# Patient Record
Sex: FEMALE | Race: WHITE | Employment: PART TIME | URBAN - METROPOLITAN AREA
[De-identification: names, ages, dates, MRNs, and addresses within clinical notes are randomized per-mention and may not be internally consistent; named-entity substitution may affect disease eponyms.]

---

## 2020-06-09 ENCOUNTER — APPOINTMENT (OUTPATIENT)
Dept: OTHER | Facility: HOSPITAL | Age: 60
End: 2020-06-09
Attending: FAMILY MEDICINE

## 2021-08-04 ENCOUNTER — HOSPITAL ENCOUNTER (OUTPATIENT)
Age: 61
Discharge: HOME | End: 2021-08-04
Attending: EMERGENCY MEDICINE
Payer: MEDICARE

## 2021-08-04 VITALS
HEART RATE: 80 BPM | DIASTOLIC BLOOD PRESSURE: 69 MMHG | OXYGEN SATURATION: 95 % | TEMPERATURE: 97 F | RESPIRATION RATE: 18 BRPM | SYSTOLIC BLOOD PRESSURE: 106 MMHG

## 2021-08-04 DIAGNOSIS — J98.01 BRONCHOSPASM: ICD-10-CM

## 2021-08-04 DIAGNOSIS — J01.41 ACUTE RECURRENT PANSINUSITIS: Primary | ICD-10-CM

## 2021-08-04 LAB — SARS-COV-2 RNA RESP QL NAA+PROBE: NOT DETECTED

## 2021-08-04 PROCEDURE — 99203 OFFICE O/P NEW LOW 30 MIN: CPT

## 2021-08-04 RX ORDER — BUDESONIDE AND FORMOTEROL FUMARATE DIHYDRATE 160; 4.5 UG/1; UG/1
2 AEROSOL RESPIRATORY (INHALATION) 2 TIMES DAILY
COMMUNITY
Start: 2021-07-05

## 2021-08-04 RX ORDER — LEVOFLOXACIN 500 MG/1
500 TABLET, FILM COATED ORAL DAILY
Qty: 10 TABLET | Refills: 0 | Status: SHIPPED | OUTPATIENT
Start: 2021-08-04 | End: 2021-08-14

## 2021-08-04 RX ORDER — ALBUTEROL SULFATE 90 UG/1
2 AEROSOL, METERED RESPIRATORY (INHALATION)
COMMUNITY
Start: 2021-07-07

## 2021-08-04 RX ORDER — QUETIAPINE 100 MG/1
100 TABLET, FILM COATED ORAL NIGHTLY
COMMUNITY
Start: 2021-05-13

## 2021-08-04 RX ORDER — ZOLPIDEM TARTRATE 10 MG/1
20 TABLET ORAL EVERY EVENING
COMMUNITY
Start: 2021-05-17

## 2021-08-04 RX ORDER — PREDNISONE 20 MG/1
40 TABLET ORAL DAILY
Qty: 10 TABLET | Refills: 0 | Status: SHIPPED | OUTPATIENT
Start: 2021-08-04 | End: 2021-08-04

## 2021-08-04 RX ORDER — FLUOXETINE HYDROCHLORIDE 20 MG/1
CAPSULE ORAL
COMMUNITY
Start: 2021-05-17

## 2021-08-04 RX ORDER — PREDNISONE 20 MG/1
40 TABLET ORAL DAILY
Qty: 10 TABLET | Refills: 0 | Status: SHIPPED | OUTPATIENT
Start: 2021-08-04 | End: 2021-08-09

## 2021-08-04 RX ORDER — METHOCARBAMOL 750 MG/1
TABLET ORAL
COMMUNITY
Start: 2021-05-05

## 2021-08-04 RX ORDER — ATORVASTATIN CALCIUM 40 MG/1
40 TABLET, FILM COATED ORAL DAILY
COMMUNITY
Start: 2021-05-11

## 2021-08-04 RX ORDER — LEVOFLOXACIN 500 MG/1
500 TABLET, FILM COATED ORAL DAILY
Qty: 10 TABLET | Refills: 0 | Status: SHIPPED | OUTPATIENT
Start: 2021-08-04 | End: 2021-08-04

## 2021-08-05 NOTE — ED PROVIDER NOTES
Patient Seen in: [facility] [place]      History   Patient presents with:  Cough/URI    Stated Complaint: sinus infection     HPI/Subjective:   HPI    The patient is a 62-year-old female with past history of recurrent sinusitis who presents now with rate and rhythm without murmur  Abdomen: Soft, nontender and nondistended  Neurologic: Patient is awake, alert and oriented ×3.   The patient's motor strength is 5 out of 5 and symmetric in the upper and lower extremities bilaterally  Extremities: No focal

## 2022-01-05 LAB — NONINV COLON CA DNA+OCC BLD SCRN STL QL: NORMAL

## 2022-06-06 ENCOUNTER — TELEPHONE (OUTPATIENT)
Dept: SCHEDULING | Age: 62
End: 2022-06-06

## 2022-06-07 ENCOUNTER — OFFICE VISIT (OUTPATIENT)
Dept: INTERNAL MEDICINE | Age: 62
End: 2022-06-07

## 2022-06-07 ENCOUNTER — TELEPHONE (OUTPATIENT)
Dept: SCHEDULING | Age: 62
End: 2022-06-07

## 2022-06-07 VITALS
BODY MASS INDEX: 43.11 KG/M2 | WEIGHT: 234.24 LBS | OXYGEN SATURATION: 97 % | SYSTOLIC BLOOD PRESSURE: 111 MMHG | HEIGHT: 62 IN | HEART RATE: 85 BPM | TEMPERATURE: 98.4 F | DIASTOLIC BLOOD PRESSURE: 72 MMHG | RESPIRATION RATE: 16 BRPM

## 2022-06-07 DIAGNOSIS — E78.5 HYPERLIPIDEMIA, UNSPECIFIED HYPERLIPIDEMIA TYPE: ICD-10-CM

## 2022-06-07 DIAGNOSIS — E66.01 CLASS 3 SEVERE OBESITY WITHOUT SERIOUS COMORBIDITY WITH BODY MASS INDEX (BMI) OF 60.0 TO 69.9 IN ADULT, UNSPECIFIED OBESITY TYPE (CMD): ICD-10-CM

## 2022-06-07 DIAGNOSIS — E03.9 ACQUIRED HYPOTHYROIDISM: ICD-10-CM

## 2022-06-07 DIAGNOSIS — F31.9 BIPOLAR 1 DISORDER (CMD): ICD-10-CM

## 2022-06-07 DIAGNOSIS — R51.9 NONINTRACTABLE HEADACHE, UNSPECIFIED CHRONICITY PATTERN, UNSPECIFIED HEADACHE TYPE: ICD-10-CM

## 2022-06-07 DIAGNOSIS — J44.9 CHRONIC OBSTRUCTIVE PULMONARY DISEASE, UNSPECIFIED COPD TYPE (CMD): ICD-10-CM

## 2022-06-07 DIAGNOSIS — G89.4 CHRONIC PAIN DISORDER: Primary | ICD-10-CM

## 2022-06-07 DIAGNOSIS — N18.9 CHRONIC KIDNEY DISEASE, UNSPECIFIED CKD STAGE: ICD-10-CM

## 2022-06-07 PROCEDURE — 99204 OFFICE O/P NEW MOD 45 MIN: CPT | Performed by: INTERNAL MEDICINE

## 2022-06-07 RX ORDER — FLUOXETINE HYDROCHLORIDE 20 MG/1
20 CAPSULE ORAL DAILY
COMMUNITY
End: 2022-06-07 | Stop reason: SDUPTHER

## 2022-06-07 RX ORDER — BUDESONIDE AND FORMOTEROL FUMARATE DIHYDRATE 80; 4.5 UG/1; UG/1
2 AEROSOL RESPIRATORY (INHALATION) 2 TIMES DAILY
Qty: 10.2 G | Refills: 12 | Status: SHIPPED | OUTPATIENT
Start: 2022-06-07

## 2022-06-07 RX ORDER — BENZTROPINE MESYLATE 1 MG/1
1 TABLET ORAL 2 TIMES DAILY
COMMUNITY
End: 2022-06-07 | Stop reason: SDUPTHER

## 2022-06-07 RX ORDER — ALBUTEROL SULFATE 90 UG/1
2 AEROSOL, METERED RESPIRATORY (INHALATION) EVERY 4 HOURS PRN
Qty: 2 EACH | Refills: 3 | Status: SHIPPED | OUTPATIENT
Start: 2022-06-07

## 2022-06-07 RX ORDER — BUTALBITAL, ASPIRIN, AND CAFFEINE 50; 325; 40 MG/1; MG/1; MG/1
1 CAPSULE ORAL EVERY 4 HOURS PRN
Qty: 60 CAPSULE | Refills: 1 | Status: SHIPPED | OUTPATIENT
Start: 2022-06-07 | End: 2022-07-18 | Stop reason: SDUPTHER

## 2022-06-07 RX ORDER — ATORVASTATIN CALCIUM 20 MG/1
20 TABLET, FILM COATED ORAL DAILY
Qty: 90 TABLET | Refills: 3 | Status: ON HOLD | OUTPATIENT
Start: 2022-06-07 | End: 2023-05-07

## 2022-06-07 RX ORDER — ATORVASTATIN CALCIUM 20 MG/1
20 TABLET, FILM COATED ORAL DAILY
Qty: 90 TABLET | Refills: 3 | Status: SHIPPED | OUTPATIENT
Start: 2022-06-07 | End: 2022-06-07 | Stop reason: DRUGHIGH

## 2022-06-07 RX ORDER — QUETIAPINE FUMARATE 25 MG/1
25 TABLET, FILM COATED ORAL 2 TIMES DAILY
Qty: 180 TABLET | Refills: 3 | Status: SHIPPED | OUTPATIENT
Start: 2022-06-07

## 2022-06-07 RX ORDER — FLUOXETINE HYDROCHLORIDE 20 MG/1
20 CAPSULE ORAL DAILY
Qty: 90 CAPSULE | Refills: 3 | Status: SHIPPED | OUTPATIENT
Start: 2022-06-07

## 2022-06-07 RX ORDER — BENZTROPINE MESYLATE 1 MG/1
1 TABLET ORAL 2 TIMES DAILY
Qty: 180 TABLET | Refills: 3 | Status: SHIPPED | OUTPATIENT
Start: 2022-06-07

## 2022-06-07 RX ORDER — ATORVASTATIN CALCIUM 40 MG/1
40 TABLET, FILM COATED ORAL DAILY
Qty: 90 TABLET | Refills: 3 | Status: SHIPPED | OUTPATIENT
Start: 2022-06-07 | End: 2023-09-25

## 2022-06-07 RX ORDER — MIRTAZAPINE 15 MG/1
15 TABLET, FILM COATED ORAL
COMMUNITY
End: 2022-06-07 | Stop reason: SDUPTHER

## 2022-06-07 RX ORDER — MIRTAZAPINE 15 MG/1
15 TABLET, FILM COATED ORAL NIGHTLY
Qty: 90 TABLET | Refills: 3 | Status: SHIPPED | OUTPATIENT
Start: 2022-06-07

## 2022-06-07 ASSESSMENT — PATIENT HEALTH QUESTIONNAIRE - PHQ9
SUM OF ALL RESPONSES TO PHQ9 QUESTIONS 1 AND 2: 0
CLINICAL INTERPRETATION OF PHQ2 SCORE: NO FURTHER SCREENING NEEDED
2. FEELING DOWN, DEPRESSED OR HOPELESS: NOT AT ALL
SUM OF ALL RESPONSES TO PHQ9 QUESTIONS 1 AND 2: 0
1. LITTLE INTEREST OR PLEASURE IN DOING THINGS: NOT AT ALL

## 2022-06-11 ENCOUNTER — TELEPHONE (OUTPATIENT)
Dept: SCHEDULING | Age: 62
End: 2022-06-11

## 2022-06-27 ENCOUNTER — TELEPHONE (OUTPATIENT)
Dept: SCHEDULING | Age: 62
End: 2022-06-27

## 2022-06-28 ENCOUNTER — HOSPITAL ENCOUNTER (OUTPATIENT)
Dept: GENERAL RADIOLOGY | Age: 62
Discharge: HOME | End: 2022-06-28

## 2022-06-28 ENCOUNTER — TELEPHONE (OUTPATIENT)
Dept: SCHEDULING | Age: 62
End: 2022-06-28

## 2022-06-28 ENCOUNTER — LAB SERVICES (OUTPATIENT)
Dept: LAB | Age: 62
End: 2022-06-28

## 2022-06-28 DIAGNOSIS — F31.9 BIPOLAR 1 DISORDER (CMD): ICD-10-CM

## 2022-06-28 DIAGNOSIS — E66.01 CLASS 3 SEVERE OBESITY WITHOUT SERIOUS COMORBIDITY WITH BODY MASS INDEX (BMI) OF 60.0 TO 69.9 IN ADULT, UNSPECIFIED OBESITY TYPE (CMD): ICD-10-CM

## 2022-06-28 DIAGNOSIS — G89.4 CHRONIC PAIN DISORDER: ICD-10-CM

## 2022-06-28 DIAGNOSIS — R51.9 NONINTRACTABLE HEADACHE, UNSPECIFIED CHRONICITY PATTERN, UNSPECIFIED HEADACHE TYPE: ICD-10-CM

## 2022-06-28 DIAGNOSIS — J44.9 CHRONIC OBSTRUCTIVE PULMONARY DISEASE, UNSPECIFIED COPD TYPE (CMD): ICD-10-CM

## 2022-06-28 DIAGNOSIS — E03.9 ACQUIRED HYPOTHYROIDISM: ICD-10-CM

## 2022-06-28 DIAGNOSIS — E78.5 HYPERLIPIDEMIA, UNSPECIFIED HYPERLIPIDEMIA TYPE: ICD-10-CM

## 2022-06-28 LAB
ALBUMIN SERPL-MCNC: 3.7 G/DL (ref 3.6–5.1)
ALBUMIN/GLOB SERPL: 1 {RATIO} (ref 1–2.4)
ALP SERPL-CCNC: 187 UNITS/L (ref 45–117)
ALT SERPL-CCNC: 23 UNITS/L
ANION GAP SERPL CALC-SCNC: 9 MMOL/L (ref 7–19)
APPEARANCE UR: CLEAR
AST SERPL-CCNC: 16 UNITS/L
BACTERIA #/AREA URNS HPF: ABNORMAL /HPF
BASOPHILS # BLD: 0.1 K/MCL (ref 0–0.3)
BASOPHILS NFR BLD: 1 %
BILIRUB SERPL-MCNC: 0.3 MG/DL (ref 0.2–1)
BILIRUB UR QL STRIP: NEGATIVE
BUN SERPL-MCNC: 17 MG/DL (ref 6–20)
BUN/CREAT SERPL: 13 (ref 7–25)
CALCIUM SERPL-MCNC: 9.6 MG/DL (ref 8.4–10.2)
CHLORIDE SERPL-SCNC: 107 MMOL/L (ref 97–110)
CHOLEST SERPL-MCNC: 241 MG/DL
CHOLEST/HDLC SERPL: 6 {RATIO}
CO2 SERPL-SCNC: 28 MMOL/L (ref 21–32)
COLOR UR: ABNORMAL
CREAT SERPL-MCNC: 1.27 MG/DL (ref 0.51–0.95)
DEPRECATED RDW RBC: 47.8 FL (ref 39–50)
EOSINOPHIL # BLD: 0.2 K/MCL (ref 0–0.5)
EOSINOPHIL NFR BLD: 2 %
ERYTHROCYTE [DISTWIDTH] IN BLOOD: 13.1 % (ref 11–15)
FASTING DURATION TIME PATIENT: ABNORMAL H
FASTING DURATION TIME PATIENT: ABNORMAL H
GFR SERPLBLD BASED ON 1.73 SQ M-ARVRAT: 48 ML/MIN
GLOBULIN SER-MCNC: 3.7 G/DL (ref 2–4)
GLUCOSE SERPL-MCNC: 90 MG/DL (ref 70–99)
GLUCOSE UR STRIP-MCNC: NEGATIVE MG/DL
HCT VFR BLD CALC: 50.5 % (ref 36–46.5)
HDLC SERPL-MCNC: 40 MG/DL
HGB BLD-MCNC: 16.2 G/DL (ref 12–15.5)
HGB UR QL STRIP: ABNORMAL
HYALINE CASTS #/AREA URNS LPF: ABNORMAL /LPF
IMM GRANULOCYTES # BLD AUTO: 0 K/MCL (ref 0–0.2)
IMM GRANULOCYTES # BLD: 0 %
KETONES UR STRIP-MCNC: NEGATIVE MG/DL
LDLC SERPL CALC-MCNC: 141 MG/DL
LEUKOCYTE ESTERASE UR QL STRIP: NEGATIVE
LYMPHOCYTES # BLD: 2.1 K/MCL (ref 1–4)
LYMPHOCYTES NFR BLD: 21 %
MCH RBC QN AUTO: 31.8 PG (ref 26–34)
MCHC RBC AUTO-ENTMCNC: 32.1 G/DL (ref 32–36.5)
MCV RBC AUTO: 99 FL (ref 78–100)
MONOCYTES # BLD: 0.5 K/MCL (ref 0.3–0.9)
MONOCYTES NFR BLD: 5 %
NEUTROPHILS # BLD: 7.2 K/MCL (ref 1.8–7.7)
NEUTROPHILS NFR BLD: 71 %
NITRITE UR QL STRIP: NEGATIVE
NONHDLC SERPL-MCNC: 201 MG/DL
NRBC BLD MANUAL-RTO: 0 /100 WBC
PH UR STRIP: 6 [PH] (ref 5–7)
PLATELET # BLD AUTO: 253 K/MCL (ref 140–450)
POTASSIUM SERPL-SCNC: 4.7 MMOL/L (ref 3.4–5.1)
PROT SERPL-MCNC: 7.4 G/DL (ref 6.4–8.2)
PROT UR STRIP-MCNC: NEGATIVE MG/DL
RBC # BLD: 5.1 MIL/MCL (ref 4–5.2)
RBC #/AREA URNS HPF: ABNORMAL /HPF
SODIUM SERPL-SCNC: 139 MMOL/L (ref 135–145)
SP GR UR STRIP: 1.01 (ref 1–1.03)
SQUAMOUS #/AREA URNS HPF: ABNORMAL /HPF
TRIGL SERPL-MCNC: 300 MG/DL
TSH SERPL-ACNC: 12.8 MCUNITS/ML (ref 0.35–5)
UROBILINOGEN UR STRIP-MCNC: 0.2 MG/DL
WBC # BLD: 10.1 K/MCL (ref 4.2–11)
WBC #/AREA URNS HPF: ABNORMAL /HPF

## 2022-06-28 PROCEDURE — 80053 COMPREHEN METABOLIC PANEL: CPT | Performed by: CLINICAL MEDICAL LABORATORY

## 2022-06-28 PROCEDURE — 72100 X-RAY EXAM L-S SPINE 2/3 VWS: CPT

## 2022-06-28 PROCEDURE — 85025 COMPLETE CBC W/AUTO DIFF WBC: CPT | Performed by: CLINICAL MEDICAL LABORATORY

## 2022-06-28 PROCEDURE — 80061 LIPID PANEL: CPT | Performed by: CLINICAL MEDICAL LABORATORY

## 2022-06-28 PROCEDURE — 73523 X-RAY EXAM HIPS BI 5/> VIEWS: CPT

## 2022-06-28 PROCEDURE — 36415 COLL VENOUS BLD VENIPUNCTURE: CPT | Performed by: CLINICAL MEDICAL LABORATORY

## 2022-06-28 PROCEDURE — 84443 ASSAY THYROID STIM HORMONE: CPT | Performed by: CLINICAL MEDICAL LABORATORY

## 2022-06-28 PROCEDURE — 81001 URINALYSIS AUTO W/SCOPE: CPT | Performed by: CLINICAL MEDICAL LABORATORY

## 2022-06-28 PROCEDURE — 71046 X-RAY EXAM CHEST 2 VIEWS: CPT

## 2022-06-28 RX ORDER — LEVOTHYROXINE SODIUM 200 UG/1
200 CAPSULE ORAL DAILY
Qty: 30 CAPSULE | Refills: 5 | Status: SHIPPED | OUTPATIENT
Start: 2022-06-28

## 2022-07-05 ENCOUNTER — TELEPHONE (OUTPATIENT)
Dept: INTERNAL MEDICINE | Age: 62
End: 2022-07-05

## 2022-07-07 ENCOUNTER — TELEPHONE (OUTPATIENT)
Dept: INTERNAL MEDICINE | Age: 62
End: 2022-07-07

## 2022-07-18 RX ORDER — BUTALBITAL, ASPIRIN, AND CAFFEINE 50; 325; 40 MG/1; MG/1; MG/1
1 CAPSULE ORAL EVERY 4 HOURS PRN
Qty: 60 CAPSULE | Refills: 1 | Status: SHIPPED | OUTPATIENT
Start: 2022-07-18 | End: 2022-09-06

## 2022-08-05 ENCOUNTER — E-ADVICE (OUTPATIENT)
Dept: INTERNAL MEDICINE | Age: 62
End: 2022-08-05

## 2022-08-08 ENCOUNTER — OFFICE VISIT (OUTPATIENT)
Dept: INTERNAL MEDICINE | Age: 62
End: 2022-08-08

## 2022-08-08 ENCOUNTER — TELEPHONE (OUTPATIENT)
Dept: SCHEDULING | Age: 62
End: 2022-08-08

## 2022-08-08 VITALS
BODY MASS INDEX: 41.79 KG/M2 | OXYGEN SATURATION: 97 % | WEIGHT: 227.07 LBS | DIASTOLIC BLOOD PRESSURE: 72 MMHG | HEART RATE: 79 BPM | HEIGHT: 62 IN | SYSTOLIC BLOOD PRESSURE: 118 MMHG | TEMPERATURE: 98.5 F

## 2022-08-08 DIAGNOSIS — E03.9 ACQUIRED HYPOTHYROIDISM: Primary | ICD-10-CM

## 2022-08-08 DIAGNOSIS — G89.29 CHRONIC MIDLINE BACK PAIN, UNSPECIFIED BACK LOCATION: ICD-10-CM

## 2022-08-08 DIAGNOSIS — G89.4 PAIN SYNDROME, CHRONIC: ICD-10-CM

## 2022-08-08 DIAGNOSIS — M81.0 OSTEOPOROSIS WITHOUT CURRENT PATHOLOGICAL FRACTURE, UNSPECIFIED OSTEOPOROSIS TYPE: ICD-10-CM

## 2022-08-08 DIAGNOSIS — M54.9 CHRONIC MIDLINE BACK PAIN, UNSPECIFIED BACK LOCATION: ICD-10-CM

## 2022-08-08 PROCEDURE — 99213 OFFICE O/P EST LOW 20 MIN: CPT | Performed by: INTERNAL MEDICINE

## 2022-08-08 RX ORDER — GABAPENTIN 300 MG/1
300 CAPSULE ORAL 3 TIMES DAILY
COMMUNITY
Start: 2022-07-22

## 2022-08-08 RX ORDER — SENNOSIDES 8.6 MG
650 CAPSULE ORAL EVERY 8 HOURS PRN
COMMUNITY

## 2022-08-08 RX ORDER — TRAMADOL HYDROCHLORIDE 50 MG/1
50 TABLET ORAL EVERY 6 HOURS PRN
Qty: 50 TABLET | Refills: 0 | Status: SHIPPED | OUTPATIENT
Start: 2022-08-08 | End: 2022-09-06

## 2022-08-08 RX ORDER — FLUOXETINE HYDROCHLORIDE 20 MG/1
CAPSULE ORAL EVERY 24 HOURS
Status: ON HOLD | COMMUNITY
End: 2023-05-07

## 2022-08-08 RX ORDER — BUTALBITAL, ACETAMINOPHEN AND CAFFEINE 50; 325; 40 MG/1; MG/1; MG/1
1 TABLET ORAL EVERY 6 HOURS PRN
COMMUNITY
Start: 2022-05-20

## 2022-08-08 RX ORDER — ALENDRONATE SODIUM 70 MG/1
70 TABLET ORAL
Qty: 12 TABLET | Refills: 3 | Status: SHIPPED | OUTPATIENT
Start: 2022-08-08 | End: 2023-07-26

## 2022-08-08 RX ORDER — LEVOTHYROXINE SODIUM 175 UG/1
175 TABLET ORAL DAILY
Status: ON HOLD | COMMUNITY
Start: 2022-05-01 | End: 2023-05-07

## 2022-08-08 RX ORDER — ZOLPIDEM TARTRATE 10 MG/1
20 TABLET ORAL NIGHTLY PRN
COMMUNITY
Start: 2022-07-08

## 2022-08-08 ASSESSMENT — PATIENT HEALTH QUESTIONNAIRE - PHQ9
1. LITTLE INTEREST OR PLEASURE IN DOING THINGS: NOT AT ALL
SUM OF ALL RESPONSES TO PHQ9 QUESTIONS 1 AND 2: 0
2. FEELING DOWN, DEPRESSED OR HOPELESS: NOT AT ALL
CLINICAL INTERPRETATION OF PHQ2 SCORE: NO FURTHER SCREENING NEEDED
SUM OF ALL RESPONSES TO PHQ9 QUESTIONS 1 AND 2: 0
1. LITTLE INTEREST OR PLEASURE IN DOING THINGS: NOT AT ALL
SUM OF ALL RESPONSES TO PHQ9 QUESTIONS 1 AND 2: 0
SUM OF ALL RESPONSES TO PHQ9 QUESTIONS 1 AND 2: 0
CLINICAL INTERPRETATION OF PHQ2 SCORE: NO FURTHER SCREENING NEEDED
2. FEELING DOWN, DEPRESSED OR HOPELESS: NOT AT ALL

## 2022-08-08 ASSESSMENT — PAIN SCALES - GENERAL: PAINLEVEL: 8

## 2022-08-18 ENCOUNTER — TELEPHONE (OUTPATIENT)
Dept: INTERNAL MEDICINE | Age: 62
End: 2022-08-18

## 2022-08-18 ENCOUNTER — TELEPHONE (OUTPATIENT)
Dept: SCHEDULING | Age: 62
End: 2022-08-18

## 2022-08-22 ENCOUNTER — TELEPHONE (OUTPATIENT)
Dept: INTERNAL MEDICINE | Age: 62
End: 2022-08-22

## 2022-08-22 DIAGNOSIS — Z12.31 ENCOUNTER FOR SCREENING MAMMOGRAM FOR MALIGNANT NEOPLASM OF BREAST: Primary | ICD-10-CM

## 2022-08-30 ENCOUNTER — TELEPHONE (OUTPATIENT)
Dept: OBGYN | Age: 62
End: 2022-08-30

## 2022-09-01 DIAGNOSIS — G89.4 CHRONIC PAIN DISORDER: ICD-10-CM

## 2022-09-01 DIAGNOSIS — M54.9 CHRONIC MIDLINE BACK PAIN, UNSPECIFIED BACK LOCATION: ICD-10-CM

## 2022-09-01 DIAGNOSIS — G89.4 PAIN SYNDROME, CHRONIC: ICD-10-CM

## 2022-09-01 DIAGNOSIS — G89.29 CHRONIC MIDLINE BACK PAIN, UNSPECIFIED BACK LOCATION: ICD-10-CM

## 2022-09-02 ENCOUNTER — TELEPHONE (OUTPATIENT)
Dept: INTERNAL MEDICINE | Age: 62
End: 2022-09-02

## 2022-09-02 ENCOUNTER — TELEPHONE (OUTPATIENT)
Dept: SCHEDULING | Age: 62
End: 2022-09-02

## 2022-09-02 RX ORDER — AZITHROMYCIN 250 MG/1
TABLET, FILM COATED ORAL
Qty: 6 TABLET | Refills: 0 | Status: SHIPPED | OUTPATIENT
Start: 2022-09-02 | End: 2023-01-27

## 2022-09-03 ENCOUNTER — TELEPHONE (OUTPATIENT)
Dept: SCHEDULING | Age: 62
End: 2022-09-03

## 2022-09-03 DIAGNOSIS — G89.4 PAIN SYNDROME, CHRONIC: ICD-10-CM

## 2022-09-03 DIAGNOSIS — M54.9 CHRONIC MIDLINE BACK PAIN, UNSPECIFIED BACK LOCATION: ICD-10-CM

## 2022-09-03 DIAGNOSIS — G89.29 CHRONIC MIDLINE BACK PAIN, UNSPECIFIED BACK LOCATION: ICD-10-CM

## 2022-09-06 RX ORDER — TRAMADOL HYDROCHLORIDE 50 MG/1
50 TABLET ORAL EVERY 6 HOURS PRN
Qty: 50 TABLET | Refills: 0 | Status: ON HOLD | OUTPATIENT
Start: 2022-09-06 | End: 2023-05-07

## 2022-09-06 RX ORDER — TRAMADOL HYDROCHLORIDE 50 MG/1
50 TABLET ORAL EVERY 6 HOURS PRN
Qty: 50 TABLET | Refills: 0 | Status: SHIPPED | OUTPATIENT
Start: 2022-09-06 | End: 2022-09-26

## 2022-09-06 RX ORDER — BUTALBITAL, ASPIRIN, AND CAFFEINE 325; 50; 40 MG/1; MG/1; MG/1
CAPSULE ORAL
Qty: 60 CAPSULE | Refills: 0 | Status: SHIPPED | OUTPATIENT
Start: 2022-09-06 | End: 2022-09-26

## 2022-09-26 DIAGNOSIS — G89.4 CHRONIC PAIN DISORDER: ICD-10-CM

## 2022-09-26 DIAGNOSIS — G89.29 CHRONIC MIDLINE BACK PAIN, UNSPECIFIED BACK LOCATION: ICD-10-CM

## 2022-09-26 DIAGNOSIS — G89.4 PAIN SYNDROME, CHRONIC: ICD-10-CM

## 2022-09-26 DIAGNOSIS — M54.9 CHRONIC MIDLINE BACK PAIN, UNSPECIFIED BACK LOCATION: ICD-10-CM

## 2022-09-26 RX ORDER — TRAMADOL HYDROCHLORIDE 50 MG/1
50 TABLET ORAL EVERY 6 HOURS PRN
Qty: 50 TABLET | Refills: 0 | Status: SHIPPED | OUTPATIENT
Start: 2022-09-26 | End: 2022-10-25

## 2022-09-26 RX ORDER — BUTALBITAL, ASPIRIN, AND CAFFEINE 325; 50; 40 MG/1; MG/1; MG/1
CAPSULE ORAL
Qty: 60 CAPSULE | Refills: 0 | Status: SHIPPED | OUTPATIENT
Start: 2022-09-26 | End: 2022-10-25

## 2022-10-11 ENCOUNTER — TELEPHONE (OUTPATIENT)
Dept: INTERNAL MEDICINE | Age: 62
End: 2022-10-11

## 2022-10-19 ENCOUNTER — CLINICAL ABSTRACT (OUTPATIENT)
Dept: OTHER | Age: 62
End: 2022-10-19

## 2022-10-24 DIAGNOSIS — G89.4 PAIN SYNDROME, CHRONIC: ICD-10-CM

## 2022-10-24 DIAGNOSIS — G89.4 CHRONIC PAIN DISORDER: ICD-10-CM

## 2022-10-24 DIAGNOSIS — G89.29 CHRONIC MIDLINE BACK PAIN, UNSPECIFIED BACK LOCATION: ICD-10-CM

## 2022-10-24 DIAGNOSIS — M54.9 CHRONIC MIDLINE BACK PAIN, UNSPECIFIED BACK LOCATION: ICD-10-CM

## 2022-10-25 RX ORDER — TRAMADOL HYDROCHLORIDE 50 MG/1
50 TABLET ORAL EVERY 6 HOURS PRN
Qty: 50 TABLET | Refills: 0 | Status: SHIPPED | OUTPATIENT
Start: 2022-10-25 | End: 2022-12-20

## 2022-10-25 RX ORDER — BUTALBITAL, ASPIRIN, AND CAFFEINE 325; 50; 40 MG/1; MG/1; MG/1
CAPSULE ORAL
Qty: 60 CAPSULE | Refills: 0 | Status: SHIPPED | OUTPATIENT
Start: 2022-10-25 | End: 2022-11-21

## 2022-11-16 ENCOUNTER — APPOINTMENT (OUTPATIENT)
Dept: OBGYN | Age: 62
End: 2022-11-16

## 2022-11-20 DIAGNOSIS — G89.4 CHRONIC PAIN DISORDER: ICD-10-CM

## 2022-11-21 RX ORDER — BUTALBITAL, ASPIRIN, AND CAFFEINE 325; 50; 40 MG/1; MG/1; MG/1
CAPSULE ORAL
Qty: 60 CAPSULE | Refills: 1 | Status: SHIPPED | OUTPATIENT
Start: 2022-11-21 | End: 2023-01-30

## 2022-11-23 ENCOUNTER — TELEPHONE (OUTPATIENT)
Dept: INTERNAL MEDICINE | Age: 62
End: 2022-11-23

## 2022-12-20 DIAGNOSIS — G89.29 CHRONIC MIDLINE BACK PAIN, UNSPECIFIED BACK LOCATION: ICD-10-CM

## 2022-12-20 DIAGNOSIS — G89.4 PAIN SYNDROME, CHRONIC: ICD-10-CM

## 2022-12-20 DIAGNOSIS — M54.9 CHRONIC MIDLINE BACK PAIN, UNSPECIFIED BACK LOCATION: ICD-10-CM

## 2022-12-20 RX ORDER — TRAMADOL HYDROCHLORIDE 50 MG/1
50 TABLET ORAL EVERY 6 HOURS PRN
Qty: 50 TABLET | Refills: 0 | Status: ON HOLD | OUTPATIENT
Start: 2022-12-20 | End: 2023-05-07

## 2022-12-20 RX ORDER — LEVOTHYROXINE SODIUM 0.2 MG/1
TABLET ORAL
Qty: 90 TABLET | Refills: 3 | Status: SHIPPED | OUTPATIENT
Start: 2022-12-20

## 2023-01-27 DIAGNOSIS — G89.4 CHRONIC PAIN DISORDER: ICD-10-CM

## 2023-01-27 RX ORDER — AZITHROMYCIN 250 MG/1
TABLET, FILM COATED ORAL
Qty: 6 TABLET | Refills: 0 | Status: ON HOLD | OUTPATIENT
Start: 2023-01-27 | End: 2023-05-07

## 2023-01-30 RX ORDER — BUTALBITAL, ASPIRIN, AND CAFFEINE 325; 50; 40 MG/1; MG/1; MG/1
CAPSULE ORAL
Qty: 60 CAPSULE | Refills: 0 | Status: ON HOLD | OUTPATIENT
Start: 2023-01-30 | End: 2023-05-07

## 2023-02-02 ENCOUNTER — NURSE TRIAGE (OUTPATIENT)
Dept: TELEHEALTH | Age: 63
End: 2023-02-02

## 2023-02-10 ENCOUNTER — TELEPHONE (OUTPATIENT)
Dept: INTERNAL MEDICINE | Age: 63
End: 2023-02-10

## 2023-05-07 ENCOUNTER — APPOINTMENT (OUTPATIENT)
Dept: CT IMAGING | Age: 63
DRG: 565 | End: 2023-05-07
Attending: EMERGENCY MEDICINE

## 2023-05-07 ENCOUNTER — HOSPITAL ENCOUNTER (INPATIENT)
Age: 63
LOS: 1 days | Discharge: LEFT BEFORE DISCHARGE | DRG: 565 | End: 2023-05-09
Attending: EMERGENCY MEDICINE | Admitting: INTERNAL MEDICINE

## 2023-05-07 ENCOUNTER — APPOINTMENT (OUTPATIENT)
Dept: GENERAL RADIOLOGY | Age: 63
DRG: 565 | End: 2023-05-07
Attending: EMERGENCY MEDICINE

## 2023-05-07 DIAGNOSIS — S22.030A CLOSED WEDGE COMPRESSION FRACTURE OF T3 VERTEBRA, INITIAL ENCOUNTER (CMD): ICD-10-CM

## 2023-05-07 DIAGNOSIS — R53.1 WEAKNESS GENERALIZED: ICD-10-CM

## 2023-05-07 DIAGNOSIS — T79.6XXA TRAUMATIC RHABDOMYOLYSIS, INITIAL ENCOUNTER (CMD): Primary | ICD-10-CM

## 2023-05-07 DIAGNOSIS — E86.0 DEHYDRATION: ICD-10-CM

## 2023-05-07 LAB
ALBUMIN SERPL-MCNC: 2.8 G/DL (ref 3.6–5.1)
ALBUMIN/GLOB SERPL: 0.6 {RATIO} (ref 1–2.4)
ALP SERPL-CCNC: 150 UNITS/L (ref 45–117)
ALT SERPL-CCNC: 55 UNITS/L
ANION GAP SERPL CALC-SCNC: 13 MMOL/L (ref 7–19)
APPEARANCE UR: CLEAR
AST SERPL-CCNC: 70 UNITS/L
BACTERIA #/AREA URNS HPF: ABNORMAL /HPF
BASOPHILS # BLD: 0 K/MCL (ref 0–0.3)
BASOPHILS NFR BLD: 0 %
BILIRUB SERPL-MCNC: 0.7 MG/DL (ref 0.2–1)
BILIRUB UR QL STRIP: NEGATIVE
BUN SERPL-MCNC: 27 MG/DL (ref 6–20)
BUN/CREAT SERPL: 20 (ref 7–25)
CALCIUM SERPL-MCNC: 9.5 MG/DL (ref 8.4–10.2)
CHLORIDE SERPL-SCNC: 110 MMOL/L (ref 97–110)
CK SERPL-CCNC: 1470 UNITS/L (ref 26–192)
CO2 SERPL-SCNC: 23 MMOL/L (ref 21–32)
COLOR UR: YELLOW
CREAT SERPL-MCNC: 1.32 MG/DL (ref 0.51–0.95)
DEPRECATED RDW RBC: 43.9 FL (ref 39–50)
EOSINOPHIL # BLD: 0 K/MCL (ref 0–0.5)
EOSINOPHIL NFR BLD: 0 %
ERYTHROCYTE [DISTWIDTH] IN BLOOD: 12.9 % (ref 11–15)
FASTING DURATION TIME PATIENT: ABNORMAL H
GFR SERPLBLD BASED ON 1.73 SQ M-ARVRAT: 46 ML/MIN
GLOBULIN SER-MCNC: 4.5 G/DL (ref 2–4)
GLUCOSE SERPL-MCNC: 164 MG/DL (ref 70–99)
GLUCOSE UR STRIP-MCNC: NEGATIVE MG/DL
HCT VFR BLD CALC: 51.2 % (ref 36–46.5)
HGB BLD-MCNC: 16.7 G/DL (ref 12–15.5)
HGB UR QL STRIP: ABNORMAL
HYALINE CASTS #/AREA URNS LPF: ABNORMAL /LPF
IMM GRANULOCYTES # BLD AUTO: 0.1 K/MCL (ref 0–0.2)
IMM GRANULOCYTES # BLD: 1 %
KETONES UR STRIP-MCNC: ABNORMAL MG/DL
LEUKOCYTE ESTERASE UR QL STRIP: NEGATIVE
LYMPHOCYTES # BLD: 0.9 K/MCL (ref 1–4)
LYMPHOCYTES NFR BLD: 5 %
MCH RBC QN AUTO: 29.9 PG (ref 26–34)
MCHC RBC AUTO-ENTMCNC: 32.6 G/DL (ref 32–36.5)
MCV RBC AUTO: 91.8 FL (ref 78–100)
MONOCYTES # BLD: 0.9 K/MCL (ref 0.3–0.9)
MONOCYTES NFR BLD: 6 %
NEUTROPHILS # BLD: 14.4 K/MCL (ref 1.8–7.7)
NEUTROPHILS NFR BLD: 88 %
NITRITE UR QL STRIP: NEGATIVE
NRBC BLD MANUAL-RTO: 0 /100 WBC
PH UR STRIP: 5.5 [PH] (ref 5–7)
PLATELET # BLD AUTO: 308 K/MCL (ref 140–450)
POTASSIUM SERPL-SCNC: 3.6 MMOL/L (ref 3.4–5.1)
PROT SERPL-MCNC: 7.3 G/DL (ref 6.4–8.2)
PROT UR STRIP-MCNC: ABNORMAL MG/DL
RAINBOW EXTRA TUBES HOLD SPECIMEN: NORMAL
RBC # BLD: 5.58 MIL/MCL (ref 4–5.2)
RBC #/AREA URNS HPF: ABNORMAL /HPF
SODIUM SERPL-SCNC: 142 MMOL/L (ref 135–145)
SP GR UR STRIP: 1.01 (ref 1–1.03)
SQUAMOUS #/AREA URNS HPF: ABNORMAL /HPF
TROPONIN I SERPL DL<=0.01 NG/ML-MCNC: 12 NG/L
UROBILINOGEN UR STRIP-MCNC: 0.2 MG/DL
WBC # BLD: 16.3 K/MCL (ref 4.2–11)
WBC #/AREA URNS HPF: ABNORMAL /HPF

## 2023-05-07 PROCEDURE — 13003394 CT THORACIC SPINE 2D REFORMATTED

## 2023-05-07 PROCEDURE — 72125 CT NECK SPINE W/O DYE: CPT

## 2023-05-07 PROCEDURE — 10002805 HB CONTRAST AGENT: Performed by: EMERGENCY MEDICINE

## 2023-05-07 PROCEDURE — G0378 HOSPITAL OBSERVATION PER HR: HCPCS

## 2023-05-07 PROCEDURE — 80053 COMPREHEN METABOLIC PANEL: CPT | Performed by: EMERGENCY MEDICINE

## 2023-05-07 PROCEDURE — 74177 CT ABD & PELVIS W/CONTRAST: CPT

## 2023-05-07 PROCEDURE — 82550 ASSAY OF CK (CPK): CPT | Performed by: EMERGENCY MEDICINE

## 2023-05-07 PROCEDURE — 10002807 HB RX 258: Performed by: INTERNAL MEDICINE

## 2023-05-07 PROCEDURE — G1004 CDSM NDSC: HCPCS

## 2023-05-07 PROCEDURE — P9612 CATHETERIZE FOR URINE SPEC: HCPCS

## 2023-05-07 PROCEDURE — 71260 CT THORAX DX C+: CPT

## 2023-05-07 PROCEDURE — 96360 HYDRATION IV INFUSION INIT: CPT

## 2023-05-07 PROCEDURE — 84484 ASSAY OF TROPONIN QUANT: CPT | Performed by: EMERGENCY MEDICINE

## 2023-05-07 PROCEDURE — 36415 COLL VENOUS BLD VENIPUNCTURE: CPT

## 2023-05-07 PROCEDURE — 85025 COMPLETE CBC W/AUTO DIFF WBC: CPT | Performed by: EMERGENCY MEDICINE

## 2023-05-07 PROCEDURE — 10002807 HB RX 258: Performed by: EMERGENCY MEDICINE

## 2023-05-07 PROCEDURE — 70450 CT HEAD/BRAIN W/O DYE: CPT

## 2023-05-07 PROCEDURE — 99285 EMERGENCY DEPT VISIT HI MDM: CPT

## 2023-05-07 PROCEDURE — 96361 HYDRATE IV INFUSION ADD-ON: CPT

## 2023-05-07 PROCEDURE — 13003398 CT LUMBAR SPINE 2D REFORMATTED

## 2023-05-07 PROCEDURE — 71045 X-RAY EXAM CHEST 1 VIEW: CPT

## 2023-05-07 PROCEDURE — 93005 ELECTROCARDIOGRAM TRACING: CPT | Performed by: EMERGENCY MEDICINE

## 2023-05-07 PROCEDURE — 80178 ASSAY OF LITHIUM: CPT | Performed by: INTERNAL MEDICINE

## 2023-05-07 PROCEDURE — 81001 URINALYSIS AUTO W/SCOPE: CPT | Performed by: EMERGENCY MEDICINE

## 2023-05-07 RX ORDER — BUDESONIDE AND FORMOTEROL FUMARATE DIHYDRATE 80; 4.5 UG/1; UG/1
2 AEROSOL RESPIRATORY (INHALATION)
Status: DISCONTINUED | OUTPATIENT
Start: 2023-05-08 | End: 2023-05-09 | Stop reason: HOSPADM

## 2023-05-07 RX ORDER — HEPARIN SODIUM 5000 [USP'U]/ML
5000 INJECTION, SOLUTION INTRAVENOUS; SUBCUTANEOUS EVERY 8 HOURS SCHEDULED
Status: DISCONTINUED | OUTPATIENT
Start: 2023-05-08 | End: 2023-05-09 | Stop reason: HOSPADM

## 2023-05-07 RX ORDER — CLONAZEPAM 0.5 MG/1
0.5 TABLET ORAL NIGHTLY PRN
COMMUNITY

## 2023-05-07 RX ORDER — GABAPENTIN 300 MG/1
300 CAPSULE ORAL 3 TIMES DAILY
Status: DISCONTINUED | OUTPATIENT
Start: 2023-05-08 | End: 2023-05-09 | Stop reason: HOSPADM

## 2023-05-07 RX ORDER — SODIUM CHLORIDE, SODIUM LACTATE, POTASSIUM CHLORIDE, CALCIUM CHLORIDE 600; 310; 30; 20 MG/100ML; MG/100ML; MG/100ML; MG/100ML
INJECTION, SOLUTION INTRAVENOUS CONTINUOUS
Status: DISCONTINUED | OUTPATIENT
Start: 2023-05-07 | End: 2023-05-09 | Stop reason: HOSPADM

## 2023-05-07 RX ORDER — LEVOTHYROXINE SODIUM 0.1 MG/1
200 TABLET ORAL
Status: DISCONTINUED | OUTPATIENT
Start: 2023-05-08 | End: 2023-05-09 | Stop reason: HOSPADM

## 2023-05-07 RX ORDER — ZOLPIDEM TARTRATE 5 MG/1
5 TABLET ORAL NIGHTLY PRN
Status: DISCONTINUED | OUTPATIENT
Start: 2023-05-07 | End: 2023-05-09 | Stop reason: HOSPADM

## 2023-05-07 RX ORDER — BENZTROPINE MESYLATE 1 MG/1
1 TABLET ORAL 2 TIMES DAILY
Status: DISCONTINUED | OUTPATIENT
Start: 2023-05-08 | End: 2023-05-09 | Stop reason: HOSPADM

## 2023-05-07 RX ORDER — ALBUTEROL SULFATE 90 UG/1
2 AEROSOL, METERED RESPIRATORY (INHALATION) EVERY 4 HOURS PRN
Status: DISCONTINUED | OUTPATIENT
Start: 2023-05-07 | End: 2023-05-09 | Stop reason: HOSPADM

## 2023-05-07 RX ORDER — ATORVASTATIN CALCIUM 40 MG/1
40 TABLET, FILM COATED ORAL DAILY
Status: DISCONTINUED | OUTPATIENT
Start: 2023-05-08 | End: 2023-05-09 | Stop reason: HOSPADM

## 2023-05-07 RX ORDER — QUETIAPINE FUMARATE 25 MG/1
25 TABLET, FILM COATED ORAL 2 TIMES DAILY
Status: DISCONTINUED | OUTPATIENT
Start: 2023-05-08 | End: 2023-05-09 | Stop reason: HOSPADM

## 2023-05-07 RX ORDER — ONDANSETRON 2 MG/ML
4 INJECTION INTRAMUSCULAR; INTRAVENOUS 2 TIMES DAILY PRN
Status: DISCONTINUED | OUTPATIENT
Start: 2023-05-07 | End: 2023-05-09 | Stop reason: HOSPADM

## 2023-05-07 RX ORDER — MIRTAZAPINE 15 MG/1
15 TABLET, FILM COATED ORAL NIGHTLY
Status: DISCONTINUED | OUTPATIENT
Start: 2023-05-08 | End: 2023-05-09 | Stop reason: HOSPADM

## 2023-05-07 RX ORDER — PROCHLORPERAZINE MALEATE 5 MG/1
5 TABLET ORAL EVERY 4 HOURS PRN
Status: DISCONTINUED | OUTPATIENT
Start: 2023-05-07 | End: 2023-05-09 | Stop reason: HOSPADM

## 2023-05-07 RX ORDER — LITHIUM CARBONATE 300 MG/1
600 CAPSULE ORAL 2 TIMES DAILY WITH MEALS
Status: DISCONTINUED | OUTPATIENT
Start: 2023-05-08 | End: 2023-05-09 | Stop reason: HOSPADM

## 2023-05-07 RX ORDER — CLONAZEPAM 0.5 MG/1
0.5 TABLET ORAL NIGHTLY PRN
Status: DISCONTINUED | OUTPATIENT
Start: 2023-05-07 | End: 2023-05-09 | Stop reason: HOSPADM

## 2023-05-07 RX ORDER — ACETAMINOPHEN 325 MG/1
650 TABLET ORAL EVERY 4 HOURS PRN
Status: DISCONTINUED | OUTPATIENT
Start: 2023-05-07 | End: 2023-05-09 | Stop reason: HOSPADM

## 2023-05-07 RX ORDER — 0.9 % SODIUM CHLORIDE 0.9 %
2 VIAL (ML) INJECTION EVERY 12 HOURS SCHEDULED
Status: DISCONTINUED | OUTPATIENT
Start: 2023-05-08 | End: 2023-05-09 | Stop reason: HOSPADM

## 2023-05-07 RX ADMIN — SODIUM CHLORIDE, POTASSIUM CHLORIDE, SODIUM LACTATE AND CALCIUM CHLORIDE: 600; 310; 30; 20 INJECTION, SOLUTION INTRAVENOUS at 23:49

## 2023-05-07 RX ADMIN — SODIUM CHLORIDE 1000 ML: 9 INJECTION, SOLUTION INTRAVENOUS at 15:22

## 2023-05-07 RX ADMIN — IOHEXOL 80 ML: 350 INJECTION, SOLUTION INTRAVENOUS at 15:58

## 2023-05-07 SDOH — ECONOMIC STABILITY: HOUSING INSECURITY: ARE YOU WORRIED ABOUT LOSING YOUR HOUSING?: NO

## 2023-05-07 SDOH — ECONOMIC STABILITY: HOUSING INSECURITY: WHAT IS YOUR LIVING SITUATION TODAY?: ALONE

## 2023-05-07 SDOH — ECONOMIC STABILITY: GENERAL

## 2023-05-07 SDOH — HEALTH STABILITY: GENERAL
BECAUSE OF A PHYSICAL, MENTAL, OR EMOTIONAL CONDITION, DO YOU HAVE SERIOUS DIFFICULTY CONCENTRATING, REMEMBERING OR MAKING DECISIONS?: YES

## 2023-05-07 SDOH — HEALTH STABILITY: PHYSICAL HEALTH: DO YOU HAVE DIFFICULTY DRESSING OR BATHING?: NO

## 2023-05-07 SDOH — ECONOMIC STABILITY: FOOD INSECURITY: HOW OFTEN IN THE PAST 12 MONTHS WERE YOU WORRIED OR STRESSED ABOUT HAVING ENOUGH MONEY TO BUY NUTRITIOUS MEALS?: NEVER

## 2023-05-07 SDOH — SOCIAL STABILITY: SOCIAL NETWORK
HOW OFTEN DO YOU SEE OR TALK TO PEOPLE THAT YOU CARE ABOUT AND FEEL CLOSE TO? (FOR EXAMPLE: TALKING TO FRIENDS ON THE PHONE, VISITING FRIENDS OR FAMILY, GOING TO CHURCH OR CLUB MEETINGS): 5 OR MORE TIMES A WEEK

## 2023-05-07 SDOH — ECONOMIC STABILITY: TRANSPORTATION INSECURITY
IN THE PAST 12 MONTHS, HAS LACK OF TRANSPORTATION KEPT YOU FROM MEETINGS, WORK, OR FROM GETTING THINGS NEEDED FOR DAILY LIVING?: NO

## 2023-05-07 SDOH — HEALTH STABILITY: GENERAL: BECAUSE OF A PHYSICAL, MENTAL, OR EMOTIONAL CONDITION, DO YOU HAVE DIFFICULTY DOING ERRANDS ALONE?: NO

## 2023-05-07 SDOH — HEALTH STABILITY: PHYSICAL HEALTH: DO YOU HAVE SERIOUS DIFFICULTY WALKING OR CLIMBING STAIRS?: YES

## 2023-05-07 SDOH — SOCIAL STABILITY: SOCIAL NETWORK: SUPPORT SYSTEMS: FAMILY MEMBERS

## 2023-05-07 SDOH — ECONOMIC STABILITY: TRANSPORTATION INSECURITY
IN THE PAST 12 MONTHS, HAS THE LACK OF TRANSPORTATION KEPT YOU FROM MEDICAL APPOINTMENTS OR FROM GETTING MEDICATIONS?: NO

## 2023-05-07 SDOH — ECONOMIC STABILITY: HOUSING INSECURITY: WHAT IS YOUR LIVING SITUATION TODAY?: HOUSE

## 2023-05-07 ASSESSMENT — ENCOUNTER SYMPTOMS
BRUISES/BLEEDS EASILY: 0
DIZZINESS: 0
FACIAL SWELLING: 0
ACTIVITY CHANGE: 0
VOMITING: 0
SORE THROAT: 0
HEADACHES: 0
EYE REDNESS: 0
COLOR CHANGE: 0
DIARRHEA: 0
COUGH: 0
NAUSEA: 0
CHILLS: 0
SHORTNESS OF BREATH: 0
BACK PAIN: 1
FEVER: 0
ABDOMINAL PAIN: 0
EYE PAIN: 0
WOUND: 0
WEAKNESS: 1
EYE DISCHARGE: 0
NUMBNESS: 0
POLYDIPSIA: 0
CONFUSION: 0
POLYPHAGIA: 0
WHEEZING: 0

## 2023-05-07 ASSESSMENT — PAIN SCALES - GENERAL
PAINLEVEL_OUTOF10: 0
PAINLEVEL_OUTOF10: 7

## 2023-05-07 ASSESSMENT — ACTIVITIES OF DAILY LIVING (ADL)
ADL_BEFORE_ADMISSION: INDEPENDENT
ADL_SCORE: 12
RECENT_DECLINE_ADL: NO

## 2023-05-07 ASSESSMENT — PATIENT HEALTH QUESTIONNAIRE - PHQ9
CLINICAL INTERPRETATION OF PHQ2 SCORE: NO FURTHER SCREENING NEEDED
1. LITTLE INTEREST OR PLEASURE IN DOING THINGS: NOT AT ALL
1. LITTLE INTEREST OR PLEASURE IN DOING THINGS: NOT AT ALL
IS PATIENT ABLE TO COMPLETE PHQ2 OR PHQ9: YES
SUM OF ALL RESPONSES TO PHQ9 QUESTIONS 1 AND 2: 0
IS PATIENT ABLE TO COMPLETE PHQ2 OR PHQ9: YES
2. FEELING DOWN, DEPRESSED OR HOPELESS: NOT AT ALL
SUM OF ALL RESPONSES TO PHQ9 QUESTIONS 1 AND 2: 0
SUM OF ALL RESPONSES TO PHQ9 QUESTIONS 1 AND 2: 0
2. FEELING DOWN, DEPRESSED OR HOPELESS: NOT AT ALL
CLINICAL INTERPRETATION OF PHQ2 SCORE: NO FURTHER SCREENING NEEDED
SUM OF ALL RESPONSES TO PHQ9 QUESTIONS 1 AND 2: 0

## 2023-05-07 ASSESSMENT — COLUMBIA-SUICIDE SEVERITY RATING SCALE - C-SSRS
IS THE PATIENT ABLE TO COMPLETE C-SSRS: YES
2. HAVE YOU ACTUALLY HAD ANY THOUGHTS OF KILLING YOURSELF?: NO
IS THE PATIENT ABLE TO COMPLETE C-SSRS: YES
1. WITHIN THE PAST MONTH, HAVE YOU WISHED YOU WERE DEAD OR WISHED YOU COULD GO TO SLEEP AND NOT WAKE UP?: NO
6. HAVE YOU EVER DONE ANYTHING, STARTED TO DO ANYTHING, OR PREPARED TO DO ANYTHING TO END YOUR LIFE?: NO
2. HAVE YOU ACTUALLY HAD ANY THOUGHTS OF KILLING YOURSELF?: NO
1. WITHIN THE PAST MONTH, HAVE YOU WISHED YOU WERE DEAD OR WISHED YOU COULD GO TO SLEEP AND NOT WAKE UP?: NO
6. HAVE YOU EVER DONE ANYTHING, STARTED TO DO ANYTHING, OR PREPARED TO DO ANYTHING TO END YOUR LIFE?: NO

## 2023-05-07 ASSESSMENT — LIFESTYLE VARIABLES
HOW OFTEN DO YOU HAVE A DRINK CONTAINING ALCOHOL: NEVER
AUDIT-C TOTAL SCORE: 0
HOW MANY STANDARD DRINKS CONTAINING ALCOHOL DO YOU HAVE ON A TYPICAL DAY: 0,1 OR 2
HOW OFTEN DO YOU HAVE 6 OR MORE DRINKS ON ONE OCCASION: NEVER

## 2023-05-08 ENCOUNTER — APPOINTMENT (OUTPATIENT)
Dept: CARDIOLOGY | Age: 63
DRG: 565 | End: 2023-05-08
Attending: INTERNAL MEDICINE

## 2023-05-08 ENCOUNTER — APPOINTMENT (OUTPATIENT)
Dept: MRI IMAGING | Age: 63
DRG: 565 | End: 2023-05-08
Attending: PHYSICIAN ASSISTANT

## 2023-05-08 LAB
ANION GAP SERPL CALC-SCNC: 10 MMOL/L (ref 7–19)
AORTIC VALVE AREA (AVA): 0.78
ASCENDING AORTA (AAD): 3
ATRIAL RATE (BPM): 121
AV STENOSIS SEVERITY TEXT: NORMAL
AVI LVOT PEAK GRADIENT (LVOTMG): 0.8
BASOPHILS # BLD: 0.1 K/MCL (ref 0–0.3)
BASOPHILS NFR BLD: 0 %
BUN SERPL-MCNC: 23 MG/DL (ref 6–20)
BUN/CREAT SERPL: 23 (ref 7–25)
CALCIUM SERPL-MCNC: 9.2 MG/DL (ref 8.4–10.2)
CHLORIDE SERPL-SCNC: 114 MMOL/L (ref 97–110)
CK SERPL-CCNC: 875 UNITS/L (ref 26–192)
CO2 SERPL-SCNC: 23 MMOL/L (ref 21–32)
CREAT SERPL-MCNC: 1 MG/DL (ref 0.51–0.95)
DEPRECATED RDW RBC: 45 FL (ref 39–50)
E WAVE DECELARATION TIME (MDT): 14.26
EOSINOPHIL # BLD: 0 K/MCL (ref 0–0.5)
EOSINOPHIL NFR BLD: 0 %
ERYTHROCYTE [DISTWIDTH] IN BLOOD: 13.1 % (ref 11–15)
FASTING DURATION TIME PATIENT: ABNORMAL H
GFR SERPLBLD BASED ON 1.73 SQ M-ARVRAT: 64 ML/MIN
GLUCOSE SERPL-MCNC: 103 MG/DL (ref 70–99)
HCT VFR BLD CALC: 46 % (ref 36–46.5)
HGB BLD-MCNC: 15.1 G/DL (ref 12–15.5)
IMM GRANULOCYTES # BLD AUTO: 0.1 K/MCL (ref 0–0.2)
IMM GRANULOCYTES # BLD: 1 %
INTERVENTRICULAR SEPTUM IN END DIASTOLE (IVSD): 2.38
LEFT INTERNAL DIMENSION IN SYSTOLE (LVSD): 1
LEFT VENTRICULAR INTERNAL DIMENSION IN DIASTOLE (LVDD): 2.9
LEFT VENTRICULAR POSTERIOR WALL IN END DIASTOLE (LVPW): 4.6
LITHIUM SERPL-SCNC: <0.2 MMOL/L (ref 0.6–1.2)
LV EF: NORMAL %
LYMPHOCYTES # BLD: 1.6 K/MCL (ref 1–4)
LYMPHOCYTES NFR BLD: 12 %
MCH RBC QN AUTO: 30.7 PG (ref 26–34)
MCHC RBC AUTO-ENTMCNC: 32.8 G/DL (ref 32–36.5)
MCV RBC AUTO: 93.5 FL (ref 78–100)
MONOCYTES # BLD: 1 K/MCL (ref 0.3–0.9)
MONOCYTES NFR BLD: 8 %
MV E TISSUE VEL MED (MESV): 5.66
MV E WAVE VEL/E TISSUE VEL MED(MSR): 5.44
MV PEAK A VELOCITY (MVPAV): 242
MV PEAK E VELOCITY (MVPEV): 1.24
NEUTROPHILS # BLD: 10.4 K/MCL (ref 1.8–7.7)
NEUTROPHILS NFR BLD: 79 %
NRBC BLD MANUAL-RTO: 0 /100 WBC
PLATELET # BLD AUTO: 267 K/MCL (ref 140–450)
POTASSIUM SERPL-SCNC: 3.6 MMOL/L (ref 3.4–5.1)
QRS-INTERVAL (MSEC): 80
QT-INTERVAL (MSEC): 416
QTC: 590
R AXIS (DEGREES): 61
RBC # BLD: 4.92 MIL/MCL (ref 4–5.2)
REPORT TEXT: NORMAL
RV END SYSTOLIC LONGITUDINAL STRAIN FREE WALL (RVGS): 2.2
SODIUM SERPL-SCNC: 143 MMOL/L (ref 135–145)
T AXIS (DEGREES): 68
TRICUSPID VALVE PEAK REGURGITATION VELOCITY (TRPV): 3.2
VENTRICULAR RATE EKG/MIN (BPM): 121
WBC # BLD: 13 K/MCL (ref 4.2–11)

## 2023-05-08 PROCEDURE — 96372 THER/PROPH/DIAG INJ SC/IM: CPT | Performed by: INTERNAL MEDICINE

## 2023-05-08 PROCEDURE — 10002807 HB RX 258: Performed by: INTERNAL MEDICINE

## 2023-05-08 PROCEDURE — 72146 MRI CHEST SPINE W/O DYE: CPT

## 2023-05-08 PROCEDURE — 80048 BASIC METABOLIC PNL TOTAL CA: CPT | Performed by: INTERNAL MEDICINE

## 2023-05-08 PROCEDURE — 10002803 HB RX 637: Performed by: INTERNAL MEDICINE

## 2023-05-08 PROCEDURE — 97166 OT EVAL MOD COMPLEX 45 MIN: CPT

## 2023-05-08 PROCEDURE — 97161 PT EVAL LOW COMPLEX 20 MIN: CPT

## 2023-05-08 PROCEDURE — 10004651 HB RX, NO CHARGE ITEM: Performed by: INTERNAL MEDICINE

## 2023-05-08 PROCEDURE — 10004180 HB COUNTER-TRANSPORT

## 2023-05-08 PROCEDURE — 99223 1ST HOSP IP/OBS HIGH 75: CPT | Performed by: PHYSICIAN ASSISTANT

## 2023-05-08 PROCEDURE — 99223 1ST HOSP IP/OBS HIGH 75: CPT | Performed by: INTERNAL MEDICINE

## 2023-05-08 PROCEDURE — G0378 HOSPITAL OBSERVATION PER HR: HCPCS

## 2023-05-08 PROCEDURE — 10002800 HB RX 250 W HCPCS: Performed by: INTERNAL MEDICINE

## 2023-05-08 PROCEDURE — 94640 AIRWAY INHALATION TREATMENT: CPT

## 2023-05-08 PROCEDURE — 94664 DEMO&/EVAL PT USE INHALER: CPT

## 2023-05-08 PROCEDURE — 97535 SELF CARE MNGMENT TRAINING: CPT

## 2023-05-08 PROCEDURE — 93306 TTE W/DOPPLER COMPLETE: CPT

## 2023-05-08 PROCEDURE — 82550 ASSAY OF CK (CPK): CPT | Performed by: INTERNAL MEDICINE

## 2023-05-08 PROCEDURE — 93306 TTE W/DOPPLER COMPLETE: CPT | Performed by: INTERNAL MEDICINE

## 2023-05-08 PROCEDURE — 10006031 HB ROOM CHARGE TELEMETRY

## 2023-05-08 PROCEDURE — G1004 CDSM NDSC: HCPCS

## 2023-05-08 PROCEDURE — 85025 COMPLETE CBC W/AUTO DIFF WBC: CPT | Performed by: INTERNAL MEDICINE

## 2023-05-08 PROCEDURE — 36415 COLL VENOUS BLD VENIPUNCTURE: CPT | Performed by: INTERNAL MEDICINE

## 2023-05-08 RX ADMIN — BUDESONIDE AND FORMOTEROL FUMARATE DIHYDRATE 2 PUFF: 80; 4.5 AEROSOL RESPIRATORY (INHALATION) at 09:38

## 2023-05-08 RX ADMIN — HEPARIN SODIUM 5000 UNITS: 5000 INJECTION INTRAVENOUS; SUBCUTANEOUS at 06:35

## 2023-05-08 RX ADMIN — BENZTROPINE MESYLATE 1 MG: 1 TABLET ORAL at 08:22

## 2023-05-08 RX ADMIN — BUDESONIDE AND FORMOTEROL FUMARATE DIHYDRATE 2 PUFF: 80; 4.5 AEROSOL RESPIRATORY (INHALATION) at 21:32

## 2023-05-08 RX ADMIN — LEVOTHYROXINE SODIUM 200 MCG: 100 TABLET ORAL at 06:33

## 2023-05-08 RX ADMIN — QUETIAPINE FUMARATE 25 MG: 25 TABLET, FILM COATED ORAL at 08:22

## 2023-05-08 RX ADMIN — SODIUM CHLORIDE, PRESERVATIVE FREE 2 ML: 5 INJECTION INTRAVENOUS at 08:23

## 2023-05-08 RX ADMIN — HEPARIN SODIUM 5000 UNITS: 5000 INJECTION INTRAVENOUS; SUBCUTANEOUS at 15:00

## 2023-05-08 RX ADMIN — ATORVASTATIN CALCIUM 40 MG: 40 TABLET, FILM COATED ORAL at 08:22

## 2023-05-08 RX ADMIN — ACETAMINOPHEN 650 MG: 325 TABLET ORAL at 06:41

## 2023-05-08 RX ADMIN — SODIUM CHLORIDE, POTASSIUM CHLORIDE, SODIUM LACTATE AND CALCIUM CHLORIDE: 600; 310; 30; 20 INJECTION, SOLUTION INTRAVENOUS at 07:40

## 2023-05-08 ASSESSMENT — ACTIVITIES OF DAILY LIVING (ADL)
ADL_SCORE: 12
PRIOR_ADL_BATHING: MODIFIED INDEPENDENT
GROOMING: INDEPENDENT
HOME_MANAGEMENT_TIME_ENTRY: 10
PRIOR_ADL: MODIFIED INDEPENDENT
RECENT_DECLINE_ADL: NO
EATING: INDEPENDENT
PRIOR_ADL_TOILETING: MODIFIED INDEPENDENT
ADL_BEFORE_ADMISSION: INDEPENDENT
ADL_SHORT_OF_BREATH: NO

## 2023-05-08 ASSESSMENT — ENCOUNTER SYMPTOMS
GASTROINTESTINAL NEGATIVE: 1
ENDOCRINE NEGATIVE: 1
WEAKNESS: 1
ALLERGIC/IMMUNOLOGIC NEGATIVE: 1
PSYCHIATRIC NEGATIVE: 1
CONSTITUTIONAL NEGATIVE: 1
EYES NEGATIVE: 1
HEMATOLOGIC/LYMPHATIC NEGATIVE: 1
RESPIRATORY NEGATIVE: 1

## 2023-05-08 ASSESSMENT — PAIN SCALES - GENERAL
PAINLEVEL_OUTOF10: 0

## 2023-05-08 ASSESSMENT — COGNITIVE AND FUNCTIONAL STATUS - GENERAL
HELP NEEDED DRESSING REGULAR LOWER BODY CLOTHING: A LITTLE
BASIC_MOBILITY_CONVERTED_SCORE: 41.05
BECAUSE OF A PHYSICAL, MENTAL, OR EMOTIONAL CONDITION, DO YOU HAVE DIFFICULTY DOING ERRANDS ALONE: NO
HELP NEEDED DRESSING REGULAR UPPER BODY CLOTHING: A LITTLE
BECAUSE OF A PHYSICAL, MENTAL, OR EMOTIONAL CONDITION, DO YOU HAVE SERIOUS DIFFICULTY CONCENTRATING, REMEMBERING OR MAKING DECISIONS: NO
DAILY_ACTIVITY_RAW_SCORE: 20
HELP NEEDED FOR TOILETING: A LITTLE
BASIC_MOBILITY_RAW_SCORE: 18
HELP NEEDED FOR BATHING: A LITTLE
DO YOU HAVE SERIOUS DIFFICULTY WALKING OR CLIMBING STAIRS: YES
DO YOU HAVE DIFFICULTY DRESSING OR BATHING: NO
DAILY_ACTIVITY_CONVERTED_SCORE: 42.03

## 2023-05-08 ASSESSMENT — LIFESTYLE VARIABLES
HOW OFTEN DO YOU HAVE 6 OR MORE DRINKS ON ONE OCCASION: NEVER
HOW OFTEN DO YOU HAVE A DRINK CONTAINING ALCOHOL: NEVER
ALCOHOL_USE_STATUS: NO OR LOW RISK WITH VALIDATED TOOL
HOW MANY STANDARD DRINKS CONTAINING ALCOHOL DO YOU HAVE ON A TYPICAL DAY: 0,1 OR 2
AUDIT-C TOTAL SCORE: 0

## 2023-05-09 VITALS
RESPIRATION RATE: 17 BRPM | HEIGHT: 61 IN | OXYGEN SATURATION: 93 % | BODY MASS INDEX: 37.54 KG/M2 | DIASTOLIC BLOOD PRESSURE: 64 MMHG | WEIGHT: 198.85 LBS | HEART RATE: 87 BPM | SYSTOLIC BLOOD PRESSURE: 106 MMHG | TEMPERATURE: 98.2 F

## 2023-05-09 PROCEDURE — 10002803 HB RX 637: Performed by: INTERNAL MEDICINE

## 2023-05-09 PROCEDURE — 99238 HOSP IP/OBS DSCHRG MGMT 30/<: CPT | Performed by: INTERNAL MEDICINE

## 2023-05-09 RX ADMIN — ATORVASTATIN CALCIUM 40 MG: 40 TABLET, FILM COATED ORAL at 08:40

## 2023-05-09 RX ADMIN — BENZTROPINE MESYLATE 1 MG: 1 TABLET ORAL at 08:40

## 2023-05-09 ASSESSMENT — PAIN SCALES - GENERAL: PAINLEVEL_OUTOF10: 0

## 2023-05-16 ENCOUNTER — OFFICE VISIT (OUTPATIENT)
Dept: INTERNAL MEDICINE | Age: 63
End: 2023-05-16

## 2023-05-16 VITALS
BODY MASS INDEX: 36.07 KG/M2 | DIASTOLIC BLOOD PRESSURE: 71 MMHG | HEIGHT: 62 IN | SYSTOLIC BLOOD PRESSURE: 106 MMHG | HEART RATE: 80 BPM | WEIGHT: 196 LBS | TEMPERATURE: 97.6 F | OXYGEN SATURATION: 96 %

## 2023-05-16 DIAGNOSIS — R21 RASH: ICD-10-CM

## 2023-05-16 DIAGNOSIS — F31.9 BIPOLAR 1 DISORDER (CMD): ICD-10-CM

## 2023-05-16 DIAGNOSIS — T14.8XXA SKIN ABRASION: ICD-10-CM

## 2023-05-16 DIAGNOSIS — R91.1 LUNG NODULE: Primary | ICD-10-CM
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
## 2023-05-16 DIAGNOSIS — J44.9 CHRONIC OBSTRUCTIVE PULMONARY DISEASE, UNSPECIFIED COPD TYPE (CMD): ICD-10-CM

## 2023-05-16 DIAGNOSIS — E66.01 CLASS 3 SEVERE OBESITY WITHOUT SERIOUS COMORBIDITY WITH BODY MASS INDEX (BMI) OF 60.0 TO 69.9 IN ADULT, UNSPECIFIED OBESITY TYPE (CMD): ICD-10-CM

## 2023-05-16 PROCEDURE — 99214 OFFICE O/P EST MOD 30 MIN: CPT | Performed by: INTERNAL MEDICINE

## 2023-05-16 RX ORDER — TRAZODONE HYDROCHLORIDE 150 MG/1
TABLET ORAL
COMMUNITY
Start: 2023-05-15

## 2023-05-16 RX ORDER — VALBENAZINE 40 MG/1
CAPSULE ORAL DAILY
COMMUNITY
Start: 2023-03-02

## 2023-05-16 ASSESSMENT — PATIENT HEALTH QUESTIONNAIRE - PHQ9
1. LITTLE INTEREST OR PLEASURE IN DOING THINGS: NOT AT ALL
SUM OF ALL RESPONSES TO PHQ9 QUESTIONS 1 AND 2: 0
SUM OF ALL RESPONSES TO PHQ9 QUESTIONS 1 AND 2: 0
CLINICAL INTERPRETATION OF PHQ2 SCORE: NO FURTHER SCREENING NEEDED
2. FEELING DOWN, DEPRESSED OR HOPELESS: NOT AT ALL

## 2023-05-16 ASSESSMENT — PAIN SCALES - GENERAL: PAINLEVEL: 0

## 2023-05-26 ENCOUNTER — HOSPITAL ENCOUNTER (OUTPATIENT)
Dept: PET IMAGING | Age: 63
Discharge: HOME | End: 2023-05-26
Attending: INTERNAL MEDICINE

## 2023-05-26 DIAGNOSIS — R91.1 LUNG NODULE: ICD-10-CM

## 2023-05-26 DIAGNOSIS — J44.9 CHRONIC OBSTRUCTIVE PULMONARY DISEASE, UNSPECIFIED COPD TYPE (CMD): ICD-10-CM

## 2023-06-02 ENCOUNTER — HOSPITAL ENCOUNTER (OUTPATIENT)
Dept: PET IMAGING | Age: 63
Discharge: HOME | End: 2023-06-02
Attending: INTERNAL MEDICINE

## 2023-06-02 PROCEDURE — G1004 CDSM NDSC: HCPCS

## 2023-06-02 PROCEDURE — 78816 PET IMAGE W/CT FULL BODY: CPT

## 2023-06-02 PROCEDURE — 10006150 HB RX 343: Performed by: INTERNAL MEDICINE

## 2023-06-02 PROCEDURE — A9552 F18 FDG: HCPCS | Performed by: INTERNAL MEDICINE

## 2023-06-02 RX ORDER — FLUDEOXYGLUCOSE F-18 300 MCI/ML
8.89 INJECTION INTRAVENOUS ONCE
Status: COMPLETED | OUTPATIENT
Start: 2023-06-02 | End: 2023-06-02

## 2023-06-02 RX ADMIN — FLUDEOXYGLUCOSE F-18 8.89 MILLICURIE: 300 INJECTION INTRAVENOUS at 13:22

## 2023-06-05 ENCOUNTER — TELEPHONE (OUTPATIENT)
Dept: INTERNAL MEDICINE | Age: 63
End: 2023-06-05

## 2023-06-23 ENCOUNTER — TELEPHONE (OUTPATIENT)
Dept: INTERNAL MEDICINE | Age: 63
End: 2023-06-23

## 2023-07-03 ENCOUNTER — TELEPHONE (OUTPATIENT)
Dept: INTERNAL MEDICINE | Age: 63
End: 2023-07-03

## 2023-07-26 DIAGNOSIS — G89.29 CHRONIC MIDLINE BACK PAIN, UNSPECIFIED BACK LOCATION: ICD-10-CM

## 2023-07-26 DIAGNOSIS — M81.0 OSTEOPOROSIS WITHOUT CURRENT PATHOLOGICAL FRACTURE, UNSPECIFIED OSTEOPOROSIS TYPE: ICD-10-CM

## 2023-07-26 DIAGNOSIS — M54.9 CHRONIC MIDLINE BACK PAIN, UNSPECIFIED BACK LOCATION: ICD-10-CM

## 2023-07-26 DIAGNOSIS — G89.4 PAIN SYNDROME, CHRONIC: ICD-10-CM

## 2023-07-26 RX ORDER — ALENDRONATE SODIUM 70 MG/1
70 TABLET ORAL
Qty: 12 TABLET | Refills: 3 | Status: SHIPPED | OUTPATIENT
Start: 2023-07-26

## 2023-07-29 ENCOUNTER — NURSE TRIAGE (OUTPATIENT)
Dept: TELEHEALTH | Age: 63
End: 2023-07-29

## 2023-08-01 ENCOUNTER — OFFICE VISIT (OUTPATIENT)
Dept: INTERNAL MEDICINE | Age: 63
End: 2023-08-01

## 2023-08-01 ENCOUNTER — HOSPITAL ENCOUNTER (OUTPATIENT)
Dept: GENERAL RADIOLOGY | Age: 63
Discharge: HOME | End: 2023-08-01

## 2023-08-01 ENCOUNTER — APPOINTMENT (OUTPATIENT)
Dept: INTERNAL MEDICINE | Age: 63
End: 2023-08-01

## 2023-08-01 ENCOUNTER — TELEPHONE (OUTPATIENT)
Dept: INTERNAL MEDICINE | Age: 63
End: 2023-08-01

## 2023-08-01 VITALS
WEIGHT: 184.75 LBS | HEART RATE: 97 BPM | HEIGHT: 62 IN | SYSTOLIC BLOOD PRESSURE: 108 MMHG | OXYGEN SATURATION: 100 % | TEMPERATURE: 98.2 F | BODY MASS INDEX: 34 KG/M2 | DIASTOLIC BLOOD PRESSURE: 69 MMHG

## 2023-08-01 DIAGNOSIS — J44.1 ACUTE EXACERBATION OF CHRONIC OBSTRUCTIVE PULMONARY DISEASE (COPD) (CMD): ICD-10-CM

## 2023-08-01 DIAGNOSIS — J44.1 ACUTE EXACERBATION OF CHRONIC OBSTRUCTIVE PULMONARY DISEASE (COPD) (CMD): Primary | ICD-10-CM

## 2023-08-01 DIAGNOSIS — L02.219 CUTANEOUS ABSCESS OF TRUNK, UNSPECIFIED SITE OF TRUNK: ICD-10-CM

## 2023-08-01 PROCEDURE — 99214 OFFICE O/P EST MOD 30 MIN: CPT | Performed by: INTERNAL MEDICINE

## 2023-08-01 PROCEDURE — 71046 X-RAY EXAM CHEST 2 VIEWS: CPT

## 2023-08-01 RX ORDER — ATORVASTATIN CALCIUM 20 MG/1
20 TABLET, FILM COATED ORAL DAILY
COMMUNITY
Start: 2023-06-27 | End: 2023-09-28

## 2023-08-01 RX ORDER — DIAZEPAM 10 MG/1
10 TABLET ORAL DAILY PRN
COMMUNITY
Start: 2023-07-24

## 2023-08-01 RX ORDER — METHYLPREDNISOLONE 4 MG/1
4 TABLET ORAL SEE ADMIN INSTRUCTIONS
Qty: 21 TABLET | Refills: 0 | Status: SHIPPED | OUTPATIENT
Start: 2023-08-01

## 2023-08-01 RX ORDER — DOXYCYCLINE HYCLATE 100 MG/1
100 CAPSULE ORAL 2 TIMES DAILY
Qty: 20 CAPSULE | Refills: 0 | Status: SHIPPED | OUTPATIENT
Start: 2023-08-01 | End: 2023-08-11

## 2023-08-01 ASSESSMENT — PATIENT HEALTH QUESTIONNAIRE - PHQ9
SUM OF ALL RESPONSES TO PHQ9 QUESTIONS 1 AND 2: 0
2. FEELING DOWN, DEPRESSED OR HOPELESS: NOT AT ALL
CLINICAL INTERPRETATION OF PHQ2 SCORE: NO FURTHER SCREENING NEEDED
SUM OF ALL RESPONSES TO PHQ9 QUESTIONS 1 AND 2: 0
1. LITTLE INTEREST OR PLEASURE IN DOING THINGS: NOT AT ALL

## 2023-08-08 ENCOUNTER — APPOINTMENT (OUTPATIENT)
Dept: INTERNAL MEDICINE | Age: 63
End: 2023-08-08

## 2023-08-08 ENCOUNTER — TELEPHONE (OUTPATIENT)
Dept: INTERNAL MEDICINE | Age: 63
End: 2023-08-08

## 2023-08-10 ENCOUNTER — OFFICE VISIT (OUTPATIENT)
Dept: INTERNAL MEDICINE | Age: 63
End: 2023-08-10

## 2023-08-10 VITALS
BODY MASS INDEX: 32.94 KG/M2 | HEART RATE: 94 BPM | OXYGEN SATURATION: 97 % | DIASTOLIC BLOOD PRESSURE: 69 MMHG | WEIGHT: 179.01 LBS | TEMPERATURE: 96.7 F | SYSTOLIC BLOOD PRESSURE: 118 MMHG

## 2023-08-10 DIAGNOSIS — L02.219 CUTANEOUS ABSCESS OF TRUNK, UNSPECIFIED SITE OF TRUNK: Primary | ICD-10-CM

## 2023-08-10 PROCEDURE — 99213 OFFICE O/P EST LOW 20 MIN: CPT | Performed by: INTERNAL MEDICINE

## 2023-08-10 RX ORDER — HYDROCODONE BITARTRATE AND ACETAMINOPHEN 5; 325 MG/1; MG/1
1 TABLET ORAL EVERY 6 HOURS PRN
Qty: 12 TABLET | Refills: 0 | Status: SHIPPED | OUTPATIENT
Start: 2023-08-10

## 2023-08-10 RX ORDER — CLINDAMYCIN PHOSPHATE 10 UG/ML
LOTION TOPICAL
COMMUNITY
Start: 2023-08-03

## 2023-08-10 RX ORDER — BENZOYL PEROXIDE 50 MG/ML
LIQUID TOPICAL
COMMUNITY
Start: 2023-08-02

## 2023-08-10 ASSESSMENT — PATIENT HEALTH QUESTIONNAIRE - PHQ9
1. LITTLE INTEREST OR PLEASURE IN DOING THINGS: NOT AT ALL
2. FEELING DOWN, DEPRESSED OR HOPELESS: NOT AT ALL
SUM OF ALL RESPONSES TO PHQ9 QUESTIONS 1 AND 2: 0
CLINICAL INTERPRETATION OF PHQ2 SCORE: NO FURTHER SCREENING NEEDED
2. FEELING DOWN, DEPRESSED OR HOPELESS: NOT AT ALL
SUM OF ALL RESPONSES TO PHQ9 QUESTIONS 1 AND 2: 0
CLINICAL INTERPRETATION OF PHQ2 SCORE: NO FURTHER SCREENING NEEDED
SUM OF ALL RESPONSES TO PHQ9 QUESTIONS 1 AND 2: 0
1. LITTLE INTEREST OR PLEASURE IN DOING THINGS: NOT AT ALL
SUM OF ALL RESPONSES TO PHQ9 QUESTIONS 1 AND 2: 0

## 2023-09-25 DIAGNOSIS — E78.5 HYPERLIPIDEMIA, UNSPECIFIED HYPERLIPIDEMIA TYPE: ICD-10-CM

## 2023-09-25 RX ORDER — ATORVASTATIN CALCIUM 40 MG/1
40 TABLET, FILM COATED ORAL DAILY
Qty: 90 TABLET | Refills: 3 | Status: SHIPPED | OUTPATIENT
Start: 2023-09-25

## 2023-09-28 RX ORDER — ATORVASTATIN CALCIUM 20 MG/1
20 TABLET, FILM COATED ORAL DAILY
Qty: 90 TABLET | Refills: 3 | Status: SHIPPED | OUTPATIENT
Start: 2023-09-28

## 2023-11-05 ENCOUNTER — TELEPHONE (OUTPATIENT)
Dept: INTERNAL MEDICINE | Age: 63
End: 2023-11-05

## 2023-11-05 DIAGNOSIS — G89.4 CHRONIC PAIN DISORDER: ICD-10-CM

## 2023-11-06 RX ORDER — BUTALBITAL, ASPIRIN, AND CAFFEINE 325; 50; 40 MG/1; MG/1; MG/1
CAPSULE ORAL
Qty: 60 CAPSULE | Refills: 0 | Status: SHIPPED | OUTPATIENT
Start: 2023-11-06

## 2023-11-14 ENCOUNTER — TELEPHONE (OUTPATIENT)
Dept: INTERNAL MEDICINE | Age: 63
End: 2023-11-14

## 2023-11-17 ENCOUNTER — TELEPHONE (OUTPATIENT)
Dept: INTERNAL MEDICINE | Age: 63
End: 2023-11-17

## 2023-11-20 ENCOUNTER — TELEPHONE (OUTPATIENT)
Dept: FAMILY MEDICINE | Age: 63
End: 2023-11-20

## 2023-11-24 ENCOUNTER — TELEPHONE (OUTPATIENT)
Dept: INTERNAL MEDICINE | Age: 63
End: 2023-11-24

## 2023-12-17 DIAGNOSIS — G89.4 CHRONIC PAIN DISORDER: ICD-10-CM

## 2023-12-18 RX ORDER — BUTALBITAL, ASPIRIN, AND CAFFEINE 325; 50; 40 MG/1; MG/1; MG/1
CAPSULE ORAL
Qty: 60 CAPSULE | Refills: 0 | Status: SHIPPED | OUTPATIENT
Start: 2023-12-18

## 2024-01-08 RX ORDER — LEVOTHYROXINE SODIUM 0.2 MG/1
TABLET ORAL
Qty: 90 TABLET | Refills: 3 | Status: SHIPPED | OUTPATIENT
Start: 2024-01-08

## 2024-01-18 RX ORDER — BUTALBITAL, ASPIRIN, AND CAFFEINE 325; 50; 40 MG/1; MG/1; MG/1
CAPSULE ORAL
Qty: 60 CAPSULE | OUTPATIENT
Start: 2024-01-18

## 2024-01-29 ENCOUNTER — TELEPHONE (OUTPATIENT)
Dept: INTERNAL MEDICINE | Age: 64
End: 2024-01-29

## 2024-01-29 DIAGNOSIS — G89.4 CHRONIC PAIN DISORDER: ICD-10-CM

## 2024-01-29 RX ORDER — BUTALBITAL, ASPIRIN, AND CAFFEINE 325; 50; 40 MG/1; MG/1; MG/1
CAPSULE ORAL
Qty: 60 CAPSULE | Refills: 0 | Status: SHIPPED | OUTPATIENT
Start: 2024-01-29

## 2024-02-27 ENCOUNTER — HOSPITAL ENCOUNTER (OUTPATIENT)
Age: 64
Discharge: HOME | End: 2024-02-27
Payer: MEDICARE

## 2024-02-27 VITALS
DIASTOLIC BLOOD PRESSURE: 80 MMHG | RESPIRATION RATE: 20 BRPM | OXYGEN SATURATION: 96 % | SYSTOLIC BLOOD PRESSURE: 137 MMHG | TEMPERATURE: 99 F | HEART RATE: 93 BPM

## 2024-02-27 DIAGNOSIS — H10.33 ACUTE CONJUNCTIVITIS OF BOTH EYES, UNSPECIFIED ACUTE CONJUNCTIVITIS TYPE: Primary | ICD-10-CM

## 2024-02-27 PROCEDURE — 99213 OFFICE O/P EST LOW 20 MIN: CPT

## 2024-02-27 RX ORDER — ERYTHROMYCIN 5 MG/G
1 OINTMENT OPHTHALMIC EVERY 6 HOURS
Qty: 1 G | Refills: 0 | Status: SHIPPED | OUTPATIENT
Start: 2024-02-27 | End: 2024-02-27

## 2024-02-27 RX ORDER — ERYTHROMYCIN 5 MG/G
1 OINTMENT OPHTHALMIC EVERY 6 HOURS
Qty: 1 G | Refills: 0 | Status: SHIPPED | OUTPATIENT
Start: 2024-02-27 | End: 2024-03-05

## 2024-02-27 NOTE — DISCHARGE INSTRUCTIONS
Continue Flonase as recommended daily to help support nasal congestion, take medications over the next week and reevaluate if not fully improving or worsening symptoms

## 2024-02-27 NOTE — ED PROVIDER NOTES
Chief Complaint   Patient presents with    Eye Problem       HPI:     Elin Walker is a 64 year old female who presents for evaluation of eye discharge over the last week, increasing purulence last few days.  No sick contacts include 2 colleagues being treated for bacterial pinkeye.  Patient denies contact lens use.  Notes mild nasal congestion over the last few weeks dating back to previously confirmed RSV last month.  Notes periodic cough.  Denies associated fever headache ear pain sore throat dysphagia neck pain chest pain shortness of breath abdominal pain vomiting diarrhea dysuria or rash.      PFSH    PFSH asessment screens reviewed and agree.  Nurses notes reviewed I agree with documentation.    No family history on file.  Family history reviewed with patient/caregiver and is not pertinent to presenting problem.  Social History     Socioeconomic History    Marital status: Single     Spouse name: Not on file    Number of children: Not on file    Years of education: Not on file    Highest education level: Not on file   Occupational History    Not on file   Tobacco Use    Smoking status: Unknown    Smokeless tobacco: Not on file   Substance and Sexual Activity    Alcohol use: Not on file    Drug use: Not on file    Sexual activity: Not on file   Other Topics Concern    Not on file   Social History Narrative    Not on file     Social Determinants of Health     Financial Resource Strain: Not on file   Food Insecurity: Not on file   Transportation Needs: Not on file   Physical Activity: Not on file   Stress: Not on file   Social Connections: Not on file   Housing Stability: Not on file         ROS:   Positive for stated complaint: Eye discharge, runny nose.  All other systems reviewed and negative except as noted above.  Constitutional and Vital Signs Reviewed.      Physical Exam:     Findings:    /80   Pulse 93   Temp 98.9 °F (37.2 °C) (Temporal)   Resp 20   SpO2 96%   GENERAL: well developed, well  nourished, well hydrated, no distress  SKIN: good skin turgor, no obvious rashes  NECK: supple, no adenopathy  EXTREMITIES: no cyanosis or edema. CORRIGAN without difficulty  GI: soft, non-tender, normal bowel sounds  HEAD: normocephalic, atraumatic  EYES: Noninjected conjunctive.  Mild purulence along the eyelids.  No associated blepharitis or visualized hordeolum.  No chemosis or proptosis.  Vision grossly normal.    EARS: TMs clear bilaterally. Canals clear.  NOSE: Positive rhinorrhea.  MMM.  Nasal turbinates: pink, normal mucosa  THROAT: clear, without exudates, uvula midline, and airway patent  LUNGS: clear to auscultation bilaterally; no rales, rhonchi, or wheezes  NEURO: No focal deficits  PSYCH: Alert and oriented x3.  Answering questions appropriately.  Mood appropriate.    MDM/Assessment/Plan:   Orders for this encounter:    Orders Placed This Encounter    erythromycin 5 MG/GM Ophthalmic Ointment     Sig: Place 1 Application into both eyes every 6 (six) hours for 7 days.     Dispense:  1 g     Refill:  0       Labs performed this visit:  No results found for this or any previous visit (from the past 10 hour(s)).    MDM:  Patient agrees to empiric coverage for bacterial conjunctivitis.  Struck to potential viral etiology versus allergic condition secondarily.  Differential includes not limited to the adenovirus.  Patient agrees to follow-up outpatient through primary within the next week if not improving symptoms versus ophthalmology for progressive changes including going to ER for acute concerns    Diagnosis:    ICD-10-CM    1. Acute conjunctivitis of both eyes, unspecified acute conjunctivitis type  H10.33           All results reviewed and discussed with patient.  See AVS for detailed discharge instructions for your condition today.    Follow Up with:  Fam Cerna MD  [address] [place] [id number] [phone]    Schedule an appointment as soon as possible for a visit in 1 week  As needed, If  symptoms worsen

## 2024-02-27 NOTE — ED INITIAL ASSESSMENT (HPI)
Patient arrived ambulatory to room c/o right eye irritation/drainage. Patient states she was exposed to pink eye at work. No vision changes. No contact lens use.

## 2024-03-20 ENCOUNTER — CASE MANAGEMENT (OUTPATIENT)
Dept: CARE COORDINATION | Age: 64
End: 2024-03-20

## 2024-03-21 ENCOUNTER — CASE MANAGEMENT (OUTPATIENT)
Dept: CARE COORDINATION | Age: 64
End: 2024-03-21

## 2024-03-25 ENCOUNTER — CASE MANAGEMENT (OUTPATIENT)
Dept: CARE COORDINATION | Age: 64
End: 2024-03-25

## 2024-03-26 ENCOUNTER — CASE MANAGEMENT (OUTPATIENT)
Dept: CARE COORDINATION | Age: 64
End: 2024-03-26

## 2024-04-10 ENCOUNTER — HOSPITAL ENCOUNTER (OUTPATIENT)
Dept: GENERAL RADIOLOGY | Age: 64
Discharge: HOME | End: 2024-04-10
Attending: INTERNAL MEDICINE
Payer: MEDICARE

## 2024-04-10 ENCOUNTER — OFFICE VISIT (OUTPATIENT)
Dept: INTERNAL MEDICINE CLINIC | Facility: CLINIC | Age: 64
End: 2024-04-10

## 2024-04-10 ENCOUNTER — LAB ENCOUNTER (OUTPATIENT)
Dept: LAB | Age: 64
End: 2024-04-10
Attending: INTERNAL MEDICINE
Payer: MEDICARE

## 2024-04-10 VITALS
HEART RATE: 94 BPM | HEIGHT: 62 IN | WEIGHT: 181 LBS | DIASTOLIC BLOOD PRESSURE: 76 MMHG | SYSTOLIC BLOOD PRESSURE: 106 MMHG | BODY MASS INDEX: 33.31 KG/M2

## 2024-04-10 DIAGNOSIS — E78.00 PURE HYPERCHOLESTEROLEMIA: ICD-10-CM

## 2024-04-10 DIAGNOSIS — M25.561 CHRONIC PAIN OF BOTH KNEES: ICD-10-CM

## 2024-04-10 DIAGNOSIS — G89.29 CHRONIC LOW BACK PAIN WITHOUT SCIATICA, UNSPECIFIED BACK PAIN LATERALITY: ICD-10-CM

## 2024-04-10 DIAGNOSIS — Z01.419 ENCOUNTER FOR GYNECOLOGICAL EXAMINATION: ICD-10-CM

## 2024-04-10 DIAGNOSIS — M54.50 CHRONIC LOW BACK PAIN WITHOUT SCIATICA, UNSPECIFIED BACK PAIN LATERALITY: ICD-10-CM

## 2024-04-10 DIAGNOSIS — N18.30 STAGE 3 CHRONIC KIDNEY DISEASE, UNSPECIFIED WHETHER STAGE 3A OR 3B CKD (HCC): ICD-10-CM

## 2024-04-10 DIAGNOSIS — M25.561 CHRONIC PAIN OF RIGHT KNEE: ICD-10-CM

## 2024-04-10 DIAGNOSIS — E55.9 VITAMIN D DEFICIENCY: ICD-10-CM

## 2024-04-10 DIAGNOSIS — E53.8 VITAMIN B12 DEFICIENCY: ICD-10-CM

## 2024-04-10 DIAGNOSIS — F31.9 BIPOLAR 1 DISORDER (HCC): ICD-10-CM

## 2024-04-10 DIAGNOSIS — M25.562 CHRONIC PAIN OF BOTH KNEES: ICD-10-CM

## 2024-04-10 DIAGNOSIS — R05.3 CHRONIC COUGH: ICD-10-CM

## 2024-04-10 DIAGNOSIS — Z12.11 ENCOUNTER FOR SCREENING COLONOSCOPY: ICD-10-CM

## 2024-04-10 DIAGNOSIS — Z87.891 HX OF SMOKING: ICD-10-CM

## 2024-04-10 DIAGNOSIS — G89.29 CHRONIC PAIN OF RIGHT KNEE: ICD-10-CM

## 2024-04-10 DIAGNOSIS — G43.009 MIGRAINE WITHOUT AURA AND WITHOUT STATUS MIGRAINOSUS, NOT INTRACTABLE: ICD-10-CM

## 2024-04-10 DIAGNOSIS — Z12.31 SCREENING MAMMOGRAM, ENCOUNTER FOR: ICD-10-CM

## 2024-04-10 DIAGNOSIS — E78.00 PURE HYPERCHOLESTEROLEMIA: Primary | ICD-10-CM

## 2024-04-10 DIAGNOSIS — G89.29 CHRONIC PAIN OF BOTH KNEES: ICD-10-CM

## 2024-04-10 PROBLEM — F51.01 PRIMARY INSOMNIA: Status: ACTIVE | Noted: 2024-04-10

## 2024-04-10 LAB
ALBUMIN SERPL-MCNC: 4.2 G/DL (ref 3.2–4.8)
ALBUMIN/GLOB SERPL: 1.4 {RATIO} (ref 1–2)
ALP LIVER SERPL-CCNC: 125 U/L
ALT SERPL-CCNC: 10 U/L
ANION GAP SERPL CALC-SCNC: 5 MMOL/L (ref 0–18)
AST SERPL-CCNC: 19 U/L (ref ?–34)
BASOPHILS # BLD AUTO: 0.07 X10(3) UL (ref 0–0.2)
BASOPHILS NFR BLD AUTO: 0.7 %
BILIRUB SERPL-MCNC: 0.3 MG/DL (ref 0.2–1.1)
BUN BLD-MCNC: 16 MG/DL (ref 9–23)
BUN/CREAT SERPL: 12 (ref 10–20)
CALCIUM BLD-MCNC: 9.8 MG/DL (ref 8.7–10.4)
CHLORIDE SERPL-SCNC: 110 MMOL/L (ref 98–112)
CHOLEST SERPL-MCNC: 276 MG/DL (ref ?–200)
CK SERPL-CCNC: 34 U/L
CO2 SERPL-SCNC: 24 MMOL/L (ref 21–32)
CREAT BLD-MCNC: 1.33 MG/DL
DEPRECATED RDW RBC AUTO: 38.7 FL (ref 35.1–46.3)
EGFRCR SERPLBLD CKD-EPI 2021: 45 ML/MIN/1.73M2 (ref 60–?)
EOSINOPHIL # BLD AUTO: 0.11 X10(3) UL (ref 0–0.7)
EOSINOPHIL NFR BLD AUTO: 1 %
ERYTHROCYTE [DISTWIDTH] IN BLOOD BY AUTOMATED COUNT: 11.5 % (ref 11–15)
FASTING PATIENT LIPID ANSWER: YES
FASTING STATUS PATIENT QL REPORTED: YES
GLOBULIN PLAS-MCNC: 3.1 G/DL (ref 2.8–4.4)
GLUCOSE BLD-MCNC: 93 MG/DL (ref 70–99)
HCT VFR BLD AUTO: 45.7 %
HDLC SERPL-MCNC: 38 MG/DL (ref 40–59)
HGB BLD-MCNC: 15.9 G/DL
IMM GRANULOCYTES # BLD AUTO: 0.05 X10(3) UL (ref 0–1)
IMM GRANULOCYTES NFR BLD: 0.5 %
LDLC SERPL CALC-MCNC: 179 MG/DL (ref ?–100)
LYMPHOCYTES # BLD AUTO: 2.39 X10(3) UL (ref 1–4)
LYMPHOCYTES NFR BLD AUTO: 22.5 %
MCH RBC QN AUTO: 31.8 PG (ref 26–34)
MCHC RBC AUTO-ENTMCNC: 34.8 G/DL (ref 31–37)
MCV RBC AUTO: 91.4 FL
MONOCYTES # BLD AUTO: 0.47 X10(3) UL (ref 0.1–1)
MONOCYTES NFR BLD AUTO: 4.4 %
NEUTROPHILS # BLD AUTO: 7.53 X10 (3) UL (ref 1.5–7.7)
NEUTROPHILS # BLD AUTO: 7.53 X10(3) UL (ref 1.5–7.7)
NEUTROPHILS NFR BLD AUTO: 70.9 %
NONHDLC SERPL-MCNC: 238 MG/DL (ref ?–130)
OSMOLALITY SERPL CALC.SUM OF ELEC: 289 MOSM/KG (ref 275–295)
PLATELET # BLD AUTO: 309 10(3)UL (ref 150–450)
POTASSIUM SERPL-SCNC: 4.6 MMOL/L (ref 3.5–5.1)
PROT SERPL-MCNC: 7.3 G/DL (ref 5.7–8.2)
RBC # BLD AUTO: 5 X10(6)UL
SODIUM SERPL-SCNC: 139 MMOL/L (ref 136–145)
T3FREE SERPL-MCNC: 2.66 PG/ML (ref 2.4–4.2)
T4 FREE SERPL-MCNC: 1.7 NG/DL (ref 0.8–1.7)
TRIGL SERPL-MCNC: 306 MG/DL (ref 30–149)
TSI SER-ACNC: 0.17 MIU/ML (ref 0.55–4.78)
VIT B12 SERPL-MCNC: 1532 PG/ML (ref 211–911)
VIT D+METAB SERPL-MCNC: 28.5 NG/ML (ref 30–100)
VLDLC SERPL CALC-MCNC: 64 MG/DL (ref 0–30)
WBC # BLD AUTO: 10.6 X10(3) UL (ref 4–11)

## 2024-04-10 PROCEDURE — 71046 X-RAY EXAM CHEST 2 VIEWS: CPT | Performed by: INTERNAL MEDICINE

## 2024-04-10 PROCEDURE — G2211 COMPLEX E/M VISIT ADD ON: HCPCS | Performed by: INTERNAL MEDICINE

## 2024-04-10 PROCEDURE — 84443 ASSAY THYROID STIM HORMONE: CPT

## 2024-04-10 PROCEDURE — 84439 ASSAY OF FREE THYROXINE: CPT

## 2024-04-10 PROCEDURE — 82550 ASSAY OF CK (CPK): CPT

## 2024-04-10 PROCEDURE — 84481 FREE ASSAY (FT-3): CPT

## 2024-04-10 PROCEDURE — 96127 BRIEF EMOTIONAL/BEHAV ASSMT: CPT | Performed by: INTERNAL MEDICINE

## 2024-04-10 PROCEDURE — 80061 LIPID PANEL: CPT

## 2024-04-10 PROCEDURE — 82607 VITAMIN B-12: CPT

## 2024-04-10 PROCEDURE — 85025 COMPLETE CBC W/AUTO DIFF WBC: CPT

## 2024-04-10 PROCEDURE — 99204 OFFICE O/P NEW MOD 45 MIN: CPT | Performed by: INTERNAL MEDICINE

## 2024-04-10 PROCEDURE — 36415 COLL VENOUS BLD VENIPUNCTURE: CPT

## 2024-04-10 PROCEDURE — 80053 COMPREHEN METABOLIC PANEL: CPT

## 2024-04-10 PROCEDURE — 82306 VITAMIN D 25 HYDROXY: CPT

## 2024-04-10 PROCEDURE — 99499 UNLISTED E&M SERVICE: CPT | Performed by: INTERNAL MEDICINE

## 2024-04-12 NOTE — PROGRESS NOTES
Subjective:   Patient ID: Elin Walker is a 64 year old female.  Chief Complaint   Patient presents with    Establish Care   Patient in office today to establish care   Patient state has multiple questions and brought list of her questions and conditions  to address .  Patient states that she is changing her PCP , states her previous doctors were not able to address all of her questions and concerns at visit time.  Patient states she has a bipolar 1 disorder she had severe ede in the past which got better with her medications but she previously gained a lot of weight since she is stable with her condition and since that she is not taking some of the medication that can cause the weight gain she lost good amount of the weight and she is doing better emotionally.  Patient is seeing psychiatrist regularly taking her medications regularly.  Patient states she has chronic lower back pain  Also chronic pain in the right knee and left knee she walks with a walker and she is dependent on that the due to her chronic pains.  She is seen many orthopedic doctors in the past and had physical therapy as well as medications injections but nothing was helping her patient states she is now coping with diet and walks with a walker.  Patient states she did not have the most recent screening test with mammogram and colonoscopy and Pap smear.  Also she Had vitamins   Checked.  She has history of elevated cholesterol.  She is taking cholesterol-lowering medications regularly would need a blood test.    Patient states she has chronic migraine headaches  Also has chronic cough she is using inhalers.  Denies any acute cough fever chills   Denies chest pain, shortnesss of breath, palpitations, or abdominal pain,  fever or chills.   Hyperlipidemia  This is a chronic problem. The current episode started more than 1 year ago. Pertinent negatives include no chest pain or shortness of breath. Current antihyperlipidemic treatment includes  statins and diet change. The current treatment provides significant improvement of lipids.       History/Other:   Review of Systems   Constitutional:  Negative for chills, fatigue and fever.   HENT:  Negative for ear pain and sore throat.          Left  ear  deaf   right  ear   has  hearing decreased  40 % - use  hearing  aid    Eyes:  Negative for pain and redness.   Respiratory: Chronic cough, no shortness of breath and wheezing.    Cardiovascular:  Negative for chest pain, palpitations and leg swelling.   Gastrointestinal:  Negative for abdominal pain, constipation, diarrhea, nausea and vomiting.   Genitourinary:  Negative for dysuria and frequency.   Musculoskeletal:  Positive for arthralgias (bill  knee pains) and back pain (lower back pain  severe   arthritis  - use  walker   5  year).   Skin:  Negative for pallor.   Neurological:  Negative for dizziness and has chronic migraine  headaches.   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   The patient is not nervous/anxious.      Current Outpatient Medications   Medication Sig Dispense Refill    Albuterol Sulfate  (90 Base) MCG/ACT Inhalation Aero Soln Inhale 2 puffs into the lungs every 4 to 6 hours as needed.      atorvastatin 40 MG Oral Tab Take 1 tablet (40 mg total) by mouth daily.      SYMBICORT 160-4.5 MCG/ACT Inhalation Aerosol Inhale 2 puffs into the lungs 2 (two) times daily.      D3-50 1.25 MG (18237 UT) Oral Cap TAKE ONE CAPSULE BY MOUTH ONCE A WEEK WITH MEALS      FLUoxetine 20 MG Oral Cap TAKE 3 CAPSULES BY MOUTH EVERY DAY IN THE EVENING      zolpidem 10 MG Oral Tab Take 2 tablets (20 mg total) by mouth every evening.       Allergies:No Known Allergies    Objective:   Physical Exam  Vitals and nursing note reviewed.    Constitutional:       General: She is not in acute distress.     Appearance: She is well-developed.      Interventions: Face mask in place.   HENT:      Head: Normocephalic and atraumatic.        General: No scleral icterus.        Right eye: No discharge.         Left eye: No discharge.   Neck:      Thyroid: No thyromegaly.      Vascular: No JVD.   Cardiovascular:      Rate and Rhythm: Normal rate and regular rhythm.      Heart sounds: Normal heart sounds. No murmur heard.  Pulmonary:      Effort: Pulmonary effort is normal.  Few wheezes on the lower bill back ,no respiratory distress.      Breath sounds: Normal breath sounds. No wheezing or rales.   Abdominal:      Palpations: Abdomen is soft. There is no mass.      Tenderness: There is no abdominal tenderness. There is no right CVA tenderness or left CVA tenderness.   Musculoskeletal:      Cervical back: Neck supple.      Right lower leg: No edema.      Left lower leg: No edema.   Lymphadenopathy:      Cervical: No cervical adenopathy.   Skin:     General: Skin is warm and dry.   Neurological:      Mental Status: She is alert and oriented to person, place, and time.   Psychiatric:         Mood and Affect: Mood is not anxious or depressed.         Speech: Speech normal.         Behavior: Behavior normal.         Thought Content: Thought content does not include homicidal or suicidal ideation. Thought content does not include homicidal or suicidal plan.         Cognition and Memory: Cognition normal.      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    Assessment & Plan:     1. Pure hypercholesterolemia  Keep low saturated fat  diet   Avoid red meat and fast, fried food  Take fruits and vegetables   Keep active /walking ,exercise as  tolerated  Reach healthy weight   Continue present  management    Atorvastatin 40 mg daily  Labs to complete    - CBC With Differential With Platelet; Future  - Comp Metabolic Panel (14); Future  - Lipid Panel; Future  - TSH W Reflex To Free T4; Future  - Vitamin B12; Future  - CK (Creatine Kinase) (Not Creatinine) (E); Future    2. Bipolar 1 disorder (HCC)   patient seeing psychiatrist and therapist regularly every 2 to 4 weeks  Continue present management   stable    Labs  3. Vitamin B12 deficiency  Labs to be completed    - Vitamin B12; Future    4. Vitamin D deficiency  Vitamin D levels to be checked  Labs  - Vitamin D; Future    5. Screening mammogram, encounter for  - [facility] ROMERO 2D+3D SCREENING BILAT (CPT=77067/21799); Future    6. Encounter for screening colonoscopy refer patient to have colonoscopy    - Gastro Referral - In Network    7. Encounter for gynecological examination  Patient is due for Pap smear referred to see the GYN  - OBG Referral - In Network    8. Chronic low back pain without sciatica, unspecified back pain laterality  Bill hip pains    Chronic patient is using the walker for 5 years for chronic low back pains and hips and  knee pains  Arthritis  Refer patient to see physical therapy DrLili Kirk for further evaluation and treatment possible physical therapy  - Physiatry Referral - In Network    9. Chronic pain of right knee >L  Worse than left  Walks with walker  Physical therapy arthritis bilateral knees uses walker to walk    10. Chronic pain of both knees  Walks with walker  Arthritis bilateral knees  Tylenol 500 mg as needed  Referred to physical therapy Dr. Kirk for further evaluation treatment and physical therapy    - Physiatry Referral - In Network    11. Migraine without aura and without status migrainosus, not intractable chronic migraine headaches  Patient seen neurologist in past referred to see neurology  For further evaluation treatment   - Neuro Referral - In Network    12. Chronic cough  History of smoking  No acute cough    Some wheezing on exam   On Symbicort -  CPM   Patient to continue present inhalers  Chest x-ray to be completed  - XR CHEST PA + LAT CHEST (CPT=71046); Future    13. Hx of smoking  - XR CHEST PA + LAT CHEST (CPT=71046); Future       14 Chronic kidney disease  Previous Labs reviewed   Avoid NSAIDs   keep good water hydration 50 to 60 ounces of water per day    Labs to complete      F/u few weeks for f/u vist annual medicare exam   Orders Placed This Encounter   Procedures    CBC With Differential With Platelet    Comp Metabolic Panel (14)    Lipid Panel    TSH W Reflex To Free T4    Vitamin B12    Vitamin D    CK (Creatine Kinase) (Not Creatinine) (E)       Meds This Visit:  Requested Prescriptions      No prescriptions requested or ordered in this encounter       Imaging & Referrals:  GASTRO - INTERNAL  OBG - INTERNAL  PHYSIATRY - INTERNAL  NEURO - INTERNAL  [facility] ROMERO 2D+3D SCREENING BILAT (CPT=77067/29389)

## 2024-04-13 ENCOUNTER — TELEPHONE (OUTPATIENT)
Dept: INTERNAL MEDICINE CLINIC | Facility: CLINIC | Age: 64
End: 2024-04-13

## 2024-04-13 NOTE — TELEPHONE ENCOUNTER
SUN.  I received a call from the patient that office called with the test results.  I reviewed her test results advised that nothing critical but elevated creatinine elevated cholesterol and suppressed TSH, normal chest x-ray.  Please follow-up with patient when you can about her test results

## 2024-04-15 DIAGNOSIS — E55.9 VITAMIN D DEFICIENCY: ICD-10-CM

## 2024-04-15 DIAGNOSIS — R79.89 ABNORMAL TSH: ICD-10-CM

## 2024-04-15 DIAGNOSIS — E78.00 PURE HYPERCHOLESTEROLEMIA: Primary | ICD-10-CM

## 2024-05-16 ENCOUNTER — TELEPHONE (OUTPATIENT)
Dept: INTERNAL MEDICINE CLINIC | Facility: CLINIC | Age: 64
End: 2024-05-16

## 2024-05-16 NOTE — TELEPHONE ENCOUNTER
Pt would like to speak to a nurse regarding her appt today that she missed, states a rep told her to come to [place] location and her appt was in [place]

## 2024-05-17 NOTE — TELEPHONE ENCOUNTER
Left message at [phone] to call us back.     Appears to have rescheduled for 5/21 with doctor at [place] location.     Future Appointments   Date Time Provider Department Center   5/21/2024  3:00 PM Fam Cerna MD [id number] [place]   6/4/2024  3:00 PM Jayleen Grier APRN [facility]   6/12/2024  2:40 PM [address] [address] [facility] [place]   7/12/2024 10:30 AM Kajal Boone MD [facility] [place]

## 2024-05-20 NOTE — TELEPHONE ENCOUNTER
Future Appointments   Date Time Provider Department Center   5/21/2024  3:00 PM Fam Cerna MD [id number] [place]

## 2024-05-21 ENCOUNTER — OFFICE VISIT (OUTPATIENT)
Dept: INTERNAL MEDICINE CLINIC | Facility: CLINIC | Age: 64
End: 2024-05-21

## 2024-05-21 VITALS
HEIGHT: 62 IN | SYSTOLIC BLOOD PRESSURE: 97 MMHG | DIASTOLIC BLOOD PRESSURE: 64 MMHG | HEART RATE: 80 BPM | BODY MASS INDEX: 35.33 KG/M2 | WEIGHT: 192 LBS

## 2024-05-21 DIAGNOSIS — N18.30 STAGE 3 CHRONIC KIDNEY DISEASE, UNSPECIFIED WHETHER STAGE 3A OR 3B CKD (HCC): ICD-10-CM

## 2024-05-21 DIAGNOSIS — E78.00 PURE HYPERCHOLESTEROLEMIA: Primary | ICD-10-CM

## 2024-05-21 DIAGNOSIS — J43.8 OTHER EMPHYSEMA (HCC): ICD-10-CM

## 2024-05-21 DIAGNOSIS — E03.8 OTHER SPECIFIED HYPOTHYROIDISM: ICD-10-CM

## 2024-05-21 DIAGNOSIS — M25.50 POLYARTHRALGIA: ICD-10-CM

## 2024-05-21 DIAGNOSIS — E55.9 VITAMIN D DEFICIENCY: ICD-10-CM

## 2024-05-21 PROCEDURE — 99214 OFFICE O/P EST MOD 30 MIN: CPT | Performed by: INTERNAL MEDICINE

## 2024-05-21 NOTE — PROGRESS NOTES
Subjective:   Patient ID: Elin Walker is a 64 year old female.  Chief Complaint   Patient presents with    Test Results     Discuss test results from 4/10/24     Patient states she has a bipolar 1 disorder she had severe ede -is under care of of the psychiatrist patient feels well right now.  States that just seeing her psychiatrist this morning    Patient states she has chronic lower back pain  Also chronic pain in the right knee and left knee she walks with a walker and she is dependent on that the due to her chronic pains.  She is seen many orthopedic doctors in the pain     Patient states she has chronic migraine headaches  Also has chronic cough she is using inhalers.  Denies any acute cough fever chills   Denies chest pain, shortnesss of breath, palpitations, or abdominal pain,  fever or chills.   Hyperlipidemia  This is a chronic problem. The current episode started more than 1 year ago. Pertinent negatives include no chest pain or shortness of breath. Current antihyperlipidemic treatment includes statins and diet change. The current treatment provides significant improvement of lipids.       History/Other:   Review of Systems   Constitutional:  Negative for chills, fatigue and fever.   HENT:  Negative for ear pain and sore throat.          Left  ear  deaf   right  ear   has  hearing decreased  40 % - use  hearing  aid    Eyes:  Negative for pain and redness.   Respiratory: Chronic cough, no shortness of breath and wheezing.    Cardiovascular:  Negative for chest pain, palpitations and leg swelling.   Gastrointestinal:  Negative for abdominal pain, constipation, diarrhea, nausea and vomiting.   Genitourinary:  Negative for dysuria and frequency.   Musculoskeletal:  Positive for arthralgias (bill  knee pains) and back pain (lower back pain  severe   arthritis  - use  walker   5  year).   Skin:  Negative for pallor.   Neurological:  Negative for dizziness and has chronic migraine  headaches.    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   The patient is not nervous/anxious.      Current Outpatient Medications   Medication Sig Dispense Refill    levothyroxine 150 MCG Oral Tab Take 1 tablet (150 mcg total) by mouth before breakfast. 1 hour .  Discontinue levothyroxine 200 mcg daily. 90 tablet 0    Albuterol Sulfate  (90 Base) MCG/ACT Inhalation Aero Soln Inhale 2 puffs into the lungs every 4 to 6 hours as needed.      atorvastatin 40 MG Oral Tab Take 1 tablet (40 mg total) by mouth daily.      SYMBICORT 160-4.5 MCG/ACT Inhalation Aerosol Inhale 2 puffs into the lungs 2 (two) times daily.      D3-50 1.25 MG (65952 UT) Oral Cap TAKE ONE CAPSULE BY MOUTH ONCE A WEEK WITH MEALS      FLUoxetine 20 MG Oral Cap TAKE 3 CAPSULES BY MOUTH EVERY DAY IN THE EVENING      zolpidem 10 MG Oral Tab Take 2 tablets (20 mg total) by mouth every evening.       Allergies:  Allergies   Allergen Reactions    Penicillin G OTHER (SEE COMMENTS)     itchiness       Objective:   Physical Exam  Vitals and nursing note reviewed.   Constitutional:       General: She is not in acute distress.     Appearance: She is well-developed.      Interventions: Face mask in place.   HENT:      Head: Normocephalic and atraumatic.        General: No scleral icterus.        Right eye: No discharge.         Left eye: No discharge.   Neck:      Thyroid: No thyromegaly.      Vascular: No JVD.   Cardiovascular:      Rate and Rhythm: Normal rate and regular rhythm.      Heart sounds: Normal heart sounds. No murmur heard.  Pulmonary:      Effort: Pulmonary effort is normal.  Few wheezes on the lower bill back ,no respiratory distress.      Breath sounds: Normal breath sounds. No wheezing or rales.   Abdominal:      Palpations: Abdomen  is soft. There is no mass.      Tenderness: There is no abdominal tenderness. There is no right CVA tenderness or left CVA tenderness.   Musculoskeletal:      Cervical back: Neck supple.   Mid back -lower back -  no  bony tenderness  scoliosis      Right lower leg: No edema.      Left lower leg: No edema.   Lymphadenopathy:      Cervical: No cervical adenopathy.   Skin:     General: Skin is warm and dry.   Neurological:      Mental Status: She is alert and oriented to person, place, and time.   Psychiatric:         Mood and Affect: Mood is not anxious or depressed.         Speech: Speech normal.         Behavior: Behavior normal.         Thought Content: Thought content does not include homicidal or suicidal ideation. Thought content does not include homicidal or suicidal plan.         Cognition and Memory: Cognition normal.      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    Assessment & Plan:     1. Pure hypercholesterolemia  Keep low saturated fat  diet   Avoid red meat and fast, fried food  Take fruits and vegetables   Keep active /walking ,exercise as  tolerated  Reach healthy weight   Continue present management    Atorvastatin 40 mg daily - take it  regularly   Labs discussed with patient cholesterol is still elevated  Patient to keep reviewed atorvastatin 40 mg daily and regularly  Improve the diet we will recheck in 3 to 4 months    - CBC With Differential With Platelet; Future  - Comp Metabolic Panel (14); Future  - Lipid Panel; Future  - TSH W Reflex To Free T4; Future  - Vitamin B12; Future  - CK (Creatine Kinase) (Not Creatinine) (E); Future    2. Bipolar 1 disorder (HCC)   patient seeing psychiatrist and therapist regularly every 2 to 4 weeks  Continue present management   Stable  Dr Marines [place] office -just had visit this  morning  Stable continue present management      Labs  3. Vitamin B12 deficiency  Take  every  other  day   Labs discussed with pt      - Vitamin B12; Future    4. Vitamin D deficiency  Vitamin D levels to be checked  Labs    Vit D3  500 mg -  1000  u   daily       5. Screening mammogram, encounter for  - [facility]ERO 2D+3D SCREENING BILAT (CPT=77067/33370); Future        8. Chronic low back pain without sciatica, unspecified back pain laterality  Bill hip pains    Bilateral knee pains  Chronic patient is using the walker for 5 years for chronic low back pains and hips and  knee pains  Arthritis  Refer patient to see physical therapy DrLili Kirk for further evaluation and treatment possible physical therapy  - Physiatry Referral - In Network    9. Chronic pain of right knee >L  Worse than left  Walks with walker  Physical therapy arthritis bilateral knees uses walker to walk   Refer to Dr. Kirk  Tylenol 500 mg tid - as needed    11. Migraine without aura and without status migrainosus, not intractable chronic migraine headaches  Patient seen neurologist in past referred to see neurology  For further evaluation treatment   - Neuro Referral - In Network    12. Chronic cough  History of smoking 1uit  5 yers ago   GFR  decreased  no contrast recommended   No acute cough   On Symbicort -  CPM   Patient to continue present inhalers  Chest x-ray - normal    Stable cpm    Refer  to Pulmo Dr Pepper     13. Hx of smoking  Cxr pa lat  discussed with ot    Findings and impression:  Normal heart size, clear lungs, normal pleura        Findings and impression:  Normal heart size, clear lungs, normal pleura          14 Chronic kidney disease stage  3   Previous Labs reviewed   Avoid NSAIDs   keep good water hydration 50 to 60 ounces of water per day    Labs to complete      F/u  within  3 months  for f/u vist annual medicare exam   No orders of the defined types were placed in this encounter.        Meds This Visit:  Requested  Prescriptions      No prescriptions requested or ordered in this encounter       Imaging & Referrals:  None

## 2024-06-12 ENCOUNTER — HOSPITAL ENCOUNTER (OUTPATIENT)
Dept: MAMMOGRAPHY | Age: 64
Discharge: HOME | End: 2024-06-12
Attending: INTERNAL MEDICINE
Payer: MEDICARE

## 2024-06-12 DIAGNOSIS — Z12.31 SCREENING MAMMOGRAM, ENCOUNTER FOR: ICD-10-CM

## 2024-06-12 LAB — HM MAMMOGRAPHY BILATERAL: NORMAL

## 2024-06-12 PROCEDURE — 77063 BREAST TOMOSYNTHESIS BI: CPT | Performed by: INTERNAL MEDICINE

## 2024-06-12 PROCEDURE — 77067 SCR MAMMO BI INCL CAD: CPT | Performed by: INTERNAL MEDICINE

## 2024-06-18 DIAGNOSIS — G89.4 CHRONIC PAIN DISORDER: ICD-10-CM

## 2024-06-18 RX ORDER — BUTALBITAL, ASPIRIN, AND CAFFEINE 325; 50; 40 MG/1; MG/1; MG/1
CAPSULE ORAL
Qty: 60 CAPSULE | Refills: 0 | Status: SHIPPED | OUTPATIENT
Start: 2024-06-18

## 2024-06-20 ENCOUNTER — HOSPITAL ENCOUNTER (OUTPATIENT)
Dept: GENERAL RADIOLOGY | Age: 64
Discharge: HOME | End: 2024-06-20
Attending: PHYSICAL MEDICINE & REHABILITATION

## 2024-06-20 ENCOUNTER — OFFICE VISIT (OUTPATIENT)
Dept: PHYSICAL MEDICINE AND REHAB | Facility: CLINIC | Age: 64
End: 2024-06-20
Payer: MEDICARE

## 2024-06-20 DIAGNOSIS — M47.812 CERVICAL SPONDYLOSIS: ICD-10-CM

## 2024-06-20 DIAGNOSIS — G89.4 CHRONIC PAIN SYNDROME: Primary | ICD-10-CM

## 2024-06-20 DIAGNOSIS — G89.4 CHRONIC PAIN SYNDROME: ICD-10-CM

## 2024-06-20 DIAGNOSIS — M47.816 LUMBAR SPONDYLOSIS: ICD-10-CM

## 2024-06-20 PROCEDURE — 72114 X-RAY EXAM L-S SPINE BENDING: CPT | Performed by: PHYSICAL MEDICINE & REHABILITATION

## 2024-06-20 PROCEDURE — 72050 X-RAY EXAM NECK SPINE 4/5VWS: CPT | Performed by: PHYSICAL MEDICINE & REHABILITATION

## 2024-06-20 PROCEDURE — 99204 OFFICE O/P NEW MOD 45 MIN: CPT | Performed by: PHYSICAL MEDICINE & REHABILITATION
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
## 2024-06-20 NOTE — PATIENT INSTRUCTIONS
-Xray of the cervical and lumbar spine   -MRI of the cervical and lumbar spine and follow up after  -Ice/heat, Lidocaine patch  -Start PT and home exercises

## 2024-07-01 ENCOUNTER — NURSE TRIAGE (OUTPATIENT)
Dept: INTERNAL MEDICINE CLINIC | Facility: CLINIC | Age: 64
End: 2024-07-01

## 2024-07-01 NOTE — TELEPHONE ENCOUNTER
Recommend  patient to schedule appointment   -with  me or  any available providers for  eval and  th   Virtual visit   Patient  has  decreased kidney function also she is on multiply medications that does not agree with Paxlovid-interactions !  especially the inhalers as well as Symbicort and   Atorvastatin ...

## 2024-07-01 NOTE — TELEPHONE ENCOUNTER
Action Requested: Summary for Provider     []  Critical Lab, Recommendations Needed  [x] Need Additional Advice  []   FYI    []   Need Orders  [] Need Medications Sent to Pharmacy  []  Other     SUMMARY: Dr. Cerna: patient is asking if you can prescribe paxlovid today?      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    Started with symptoms Saturday.   Has daily migraine, sinus congestion.   Bodyaches, chills, fever   Yesterday chest felt heavy; but not today.     Has Runny nose; coughing. Doesn't feel well.      Patient takes butalbital/asiprin/caffeine 325/50/40mg for her migraines.     There are no appointment available.   Reason for Disposition   Continuous (nonstop) coughing interferes with work or school and no improvement using cough treatment per Care Advice    Protocols used: Coronavirus (COVID-19) Diagnosed or [id number]

## 2024-07-02 ENCOUNTER — TELEPHONE (OUTPATIENT)
Dept: INTERNAL MEDICINE CLINIC | Facility: CLINIC | Age: 64
End: 2024-07-02

## 2024-07-02 NOTE — TELEPHONE ENCOUNTER
[facility] called, verified patient's Name and . States they received prescription for Paxlovid, however, direction for renal dosing does not match the strength and quantity of medication. Also [facility] in [place] does not have the medication in stock. Closest available is [facility] in [place]. Pharmacist available with any further questions.     JAKE Roche

## 2024-07-02 NOTE — TELEPHONE ENCOUNTER
Patient returned our call ( Identified name and date of birth )  patient would like to discuss use of Paxlovid , She cannot do a video visit only a phone visit       Future Appointments   Date Time Provider Department [place]   7/2/2024  2:00 PM [facility] RM3 [facility] EM [facility]   7/2/2024  2:40 PM Kaley Vela APRN ECLMBIM2  [place]   7/12/2024 10:30 AM Kajal Boone MD ECLMBOBGYN  [place]   7/16/2024  1:30 PM Chris Chambers DO ECLMBRHSERAM EC [place]   8/5/2024  3:00 PM Jayleen Grier APRN [facility]   8/21/2024  1:20 PM Linda Foster DO ENIELHUR [place] [facility]   9/13/2024  2:45 PM Hermelinda Bhatia [facility]

## 2024-07-02 NOTE — TELEPHONE ENCOUNTER
Patient called and said she received a phone call for her visit today and  did not get to answer.

## 2024-07-03 NOTE — TELEPHONE ENCOUNTER
Spoke with Ana Paula boyd tech , Date of Birth verified  She stated they got the paxlovid refill from yesterday, they are waiting for med to come in today, they will inform patient when ready for .

## 2024-07-03 NOTE — TELEPHONE ENCOUNTER
Spoke with patient, Date of Birth verified  She was informed of below message, she stated she doesn't live near [facility].   She was informed [facility] [place] does not have medication in stock.   She was informed to call [facility], patient hang up.       SUN

## 2024-07-11 ENCOUNTER — TELEPHONE (OUTPATIENT)
Dept: INTERNAL MEDICINE | Age: 64
End: 2024-07-11

## 2024-07-11 ENCOUNTER — NURSE TRIAGE (OUTPATIENT)
Dept: INTERNAL MEDICINE CLINIC | Facility: CLINIC | Age: 64
End: 2024-07-11

## 2024-07-11 NOTE — TELEPHONE ENCOUNTER
Action Requested: Summary for Provider     []  Critical Lab, Recommendations Needed  [] Need Additional Advice  [x]   FYI    []   Need Orders  [] Need Medications Sent to Pharmacy  []  Other     SUMMARY: Disposition is See Today or Tomorrow in Office.  Patient is not available for office visit today or tomorrow and is asking that an antibiotic be prescribed:  Future Appointments   Date Time Provider Department Center   7/11/2024  7:20 PM Kaley Vela, JAKE ECLMBIM2 [facility] [place]         Reason for call: Sinusitis  Onset: 10 days    Patient calling,verified name and date of birth. Covid Pos 7-1-24, see 7/2/24 virtual visit with JAKE Vela. Today she is requesting an antibiotic for persistent sinus congestion, yellow/green nasal drainage and persistent cough. Also reduced hearing  with hearing aids. Afebrile.  Repeat Covid  test iwas negative.   Reviewed care advice, Patient is not available for office visit today or tomorrow, agrees to telemedicine visit today and she  disconnected call once that was scheduled.   Reason for Disposition   Nasal discharge present > 10 days    Protocols used: Sinus Pain or Congestion-A-OH

## 2024-07-12 ENCOUNTER — OFFICE VISIT (OUTPATIENT)
Dept: OBGYN CLINIC | Facility: CLINIC | Age: 64
End: 2024-07-12

## 2024-07-12 VITALS
DIASTOLIC BLOOD PRESSURE: 51 MMHG | SYSTOLIC BLOOD PRESSURE: 94 MMHG | WEIGHT: 192 LBS | BODY MASS INDEX: 35 KG/M2 | HEART RATE: 67 BPM

## 2024-07-12 DIAGNOSIS — Z01.419 WELL WOMAN EXAM WITH ROUTINE GYNECOLOGICAL EXAM: Primary | ICD-10-CM

## 2024-07-12 DIAGNOSIS — Z12.4 SCREENING FOR CERVICAL CANCER: ICD-10-CM

## 2024-07-12 LAB
CYTOLOGY CVX/VAG DOC THIN PREP: NORMAL
HPV16+18+45 E6+E7MRNA CVX NAA+PROBE: NEGATIVE

## 2024-07-12 PROCEDURE — G0101 CA SCREEN;PELVIC/BREAST EXAM: HCPCS | Performed by: OBSTETRICS & GYNECOLOGY

## 2024-07-12 NOTE — PROGRESS NOTES
Elin Walker is a 64 year old female  No LMP recorded. Patient is postmenopausal.   Chief Complaint   Patient presents with    Gyn Exam     Annual, pap smear, c/o labial lump    Refill Request     D3   Presenting for well woman exam. Last pap smear was several years. Has mammogram scheduled. History of vulvar cancer per patient in .     OBSTETRICS HISTORY:  OB History    Para Term  AB Living   1 1 1 0 0 1   SAB IAB Ectopic Multiple Live Births   0 0 0 0 1       GYNE HISTORY:  No LMP recorded. Patient is postmenopausal.    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 No pertinent past medical history.      SURGICAL HISTORY:  History reviewed. No pertinent surgical history.    SOCIAL HISTORY:  Social History     Socioeconomic History    Marital status: Single     Spouse name: Not on file    Number of children: Not on file    Years of education: Not on file    Highest education level: Not on file   Occupational History    Not on file   Tobacco Use    Smoking status: Some Days     Types: Cigarettes    Smokeless tobacco: Not on file    Tobacco comments:     On and off   Vaping Use    Vaping status: Never Used   Substance and Sexual Activity    Alcohol use: Never    Drug use: Never    Sexual activity: Not on file   Other Topics Concern    Not on file   Social History Narrative    Not on file     Social Determinants of Health     Financial Resource Strain: Low Risk  (2023)    [facility]    Financial Resource Strain     In the past year, have you or any family members you live with been unable to get any of the following when it was really needed? Check all that apply.: None   Food Insecurity: Not At Risk (2023)    [facility]    Food  Insecurity     RETIRE How often in the past 12 months would you say you are worried or stressed about having enough money to buy nutritious meals? : Never   Transportation Needs: No Transportation Needs (5/7/2023)    [facility]    PRAPARE - Transportation     In the past 12 months, has lack of transportation kept you from medical appointments or from getting medications?: No     In the past 12 months, has lack of transportation kept you from meetings, work, or from getting things needed for daily living?: No   Physical Activity: Not on file   Stress: Not on file   Social Connections: Low Risk  (5/8/2023)    [facility]    Social Connections     How often do you see or talk to people that you care about and feel close to? (For example: talking to friends on the phone, visiting friends or family, going to Orthodox or club meetings): 5 or more times a week   Housing Stability: Not on file         Depression Screening (PHQ-2/PHQ-9): Over the LAST 2 WEEKS   Little interest or pleasure in doing things (over the last two weeks)?: Not at all    Feeling down, depressed, or hopeless (over the last two weeks)?: Not at all    PHQ-2 SCORE: 0           MEDICATIONS:    Current Outpatient Medications:     doxycycline 100 MG Oral Cap, Take 1 capsule (100 mg total) by mouth 2 (two) times daily for 7 days., Disp: 14 capsule, Rfl: 0    predniSONE 20 MG Oral Tab, Take 1 tablet (20 mg total) by mouth 2 (two) times daily for 5 days., Disp: 10 tablet, Rfl: 0    benzonatate 200 MG Oral Cap, Take 1 capsule (200 mg total) by mouth 3 (three) times daily as needed for cough., Disp: 30 capsule, Rfl: 0    levothyroxine 150 MCG Oral Tab, Take 1 tablet (150 mcg total) by mouth before breakfast. 1 hour .  Discontinue levothyroxine 200 mcg daily., Disp: 90 tablet, Rfl: 0    atorvastatin 40 MG Oral Tab, Take  1 tablet (40 mg total) by mouth daily., Disp: , Rfl:     SYMBICORT 160-4.5 MCG/ACT Inhalation Aerosol, Inhale 2 puffs into the lungs 2 (two) times daily., Disp: , Rfl:     D3-50 1.25 MG (21065 UT) Oral Cap, TAKE ONE CAPSULE BY MOUTH ONCE A WEEK WITH MEALS, Disp: , Rfl:     FLUoxetine 20 MG Oral Cap, TAKE 3 CAPSULES BY MOUTH EVERY DAY IN THE EVENING, Disp: , Rfl:     zolpidem 10 MG Oral Tab, Take 2 tablets (20 mg total) by mouth every evening., Disp: , Rfl:     albuterol 108 (90 Base) MCG/ACT Inhalation Aero Soln, Inhale 2 puffs into the lungs every 4 (four) hours as needed for Wheezing or Shortness of Breath., Disp: 18 g, Rfl: 5    Albuterol Sulfate  (90 Base) MCG/ACT Inhalation Aero Soln, Inhale 2 puffs into the lungs every 4 to 6 hours as needed., Disp: , Rfl:     ALLERGIES:    Allergies   Allergen Reactions    Penicillin G OTHER (SEE COMMENTS)     itchiness         Review of Systems:  Review of Systems   All other systems reviewed and are negative.       Vitals:    07/12/24 1044   BP: 94/51   Pulse: 67       PHYSICAL EXAM:   Physical Exam  Vitals reviewed.   Constitutional:       Appearance: Normal appearance.   HENT:      Head: Atraumatic.   Eyes:      Pupils: Pupils are equal, round, and reactive to light.   Pulmonary:      Effort: Pulmonary effort is normal.   Chest:   Breasts:     Right: Normal. No bleeding, inverted nipple, mass, nipple discharge, skin change or tenderness.      Left: Normal. No bleeding, inverted nipple, mass, nipple discharge, skin change or tenderness.   Abdominal:      General: Abdomen is flat.      Palpations: Abdomen is soft.      Tenderness: There is no abdominal tenderness.   Genitourinary:     General: Normal vulva.      Exam position: Lithotomy position.      Labia:         Right: No rash, tenderness, lesion or injury.         Left: No rash, tenderness, lesion or injury.       Vagina: Normal.      Cervix: Normal.      Uterus: Normal. Not tender.       Adnexa: Right adnexa  normal and left adnexa normal.        Right: No tenderness or fullness.          Left: No tenderness or fullness.     Lymphadenopathy:      Upper Body:      Right upper body: No supraclavicular, axillary or pectoral adenopathy.      Left upper body: No supraclavicular, axillary or pectoral adenopathy.   Skin:     General: Skin is warm and dry.   Neurological:      General: No focal deficit present.      Mental Status: She is alert and oriented to person, place, and time.   Psychiatric:         Mood and Affect: Mood normal.         Behavior: Behavior normal.         Thought Content: Thought content normal.         Judgment: Judgment normal.           Assessment & Plan:  Elin was seen today for gyn exam and refill request.    Diagnoses and all orders for this visit:    Well woman exam with routine gynecological exam    Screening for cervical cancer  -     ThinPrep PAP Smear; Future  -     Hpv Dna  High Risk , Thin Prep Collect; Future  -     ThinPrep PAP Smear  -     Hpv Dna  High Risk , Thin Prep Collect        Requested Prescriptions      No prescriptions requested or ordered in this encounter       Pap with HPV done. New ASSCP guidelines reviewed in detail. Annual exams encouraged. Mammogram scheduled. Call if any vaginal bleeding.  Encouraged 1500 mg calcium w/ vit D.  Encouraged weight bearing exercise.  Follow-up in one year.

## 2024-07-15 ENCOUNTER — HOSPITAL ENCOUNTER (OUTPATIENT)
Dept: ULTRASOUND IMAGING | Facility: HOSPITAL | Age: 64
Discharge: HOME | End: 2024-07-15
Attending: INTERNAL MEDICINE
Payer: MEDICARE

## 2024-07-15 ENCOUNTER — HOSPITAL ENCOUNTER (OUTPATIENT)
Dept: MAMMOGRAPHY | Facility: HOSPITAL | Age: 64
Discharge: HOME | End: 2024-07-15
Attending: INTERNAL MEDICINE
Payer: MEDICARE

## 2024-07-15 DIAGNOSIS — R92.8 ABNORMAL MAMMOGRAM: ICD-10-CM

## 2024-07-15 LAB — HPV E6+E7 MRNA CVX QL NAA+PROBE: NEGATIVE

## 2024-07-15 PROCEDURE — 76642 ULTRASOUND BREAST LIMITED: CPT | Performed by: INTERNAL MEDICINE

## 2024-07-15 PROCEDURE — 77061 BREAST TOMOSYNTHESIS UNI: CPT | Performed by: INTERNAL MEDICINE

## 2024-07-15 PROCEDURE — 77065 DX MAMMO INCL CAD UNI: CPT | Performed by: INTERNAL MEDICINE

## 2024-07-15 NOTE — IMAGING NOTE
Discussed recommended breast biopsy with patient.  Patient is being recommended for stereotactic biopsy of the LEFT Breast  by Dr. LAWRENCE  Pt history discussed as below:  Patient uses walker. 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 Has super order. Provided Wed 7/24 10am check in 915am.   Pt history of biopsy: no       Family history of cancer:  mother- lung cancer; brother- lung cancer; sister- cancer; grandparents x4- unknown types of cancer  Pt history of breast cancer:  no  Hx BCP use:                    HRT use:        Recommedations :      STEREOTACTIC GUIDED LEFT BREAST BIOPSY                see [facility] for dictated radiology report   Reviewed pertinent patient history, family history of cancer, and patient medications  Current Outpatient Medications   Medication Sig Dispense Refill    albuterol 108 (90 Base) MCG/ACT Inhalation Aero Soln Inhale 2 puffs into the lungs every 4 (four) hours as needed for Wheezing or Shortness of Breath. 18 g 5    doxycycline 100 MG Oral Cap Take 1 capsule (100 mg total) by mouth 2 (two) times daily for 7 days. 14 capsule 0    predniSONE 20 MG Oral Tab Take 1 tablet (20 mg total) by mouth 2 (two) times daily for 5 days. 10 tablet 0    benzonatate 200 MG Oral Cap Take 1 capsule (200 mg total) by mouth 3 (three) times daily as needed for cough. 30 capsule 0    levothyroxine 150 MCG Oral Tab Take 1 tablet (150 mcg total) by mouth before breakfast. 1 hour .  Discontinue levothyroxine 200 mcg daily. 90 tablet 0    Albuterol Sulfate  (90 Base) MCG/ACT Inhalation Aero Soln Inhale 2 puffs into the lungs every 4 to 6 hours as needed.      atorvastatin 40 MG Oral Tab Take 1 tablet (40 mg total) by mouth daily.      SYMBICORT 160-4.5 MCG/ACT Inhalation Aerosol Inhale 2 puffs into the lungs 2 (two) times daily.      D3-50 1.25 MG (45473 UT) Oral Cap TAKE ONE CAPSULE BY MOUTH ONCE A WEEK WITH MEALS      FLUoxetine 20 MG Oral Cap TAKE 3 CAPSULES BY MOUTH EVERY DAY IN  THE EVENING      zolpidem 10 MG Oral Tab Take 2 tablets (20 mg total) by mouth every evening.         Discussed with patient Radiology’s protocol regarding medications, as well as over-the-counter vitamin or herbal supplements, as follows:  -All herbal supplements, Vitamin E, Fish Oil    -All NSAIDs (Ibuprofen, Motrin, Advil, Aleve, or other antiinflammatory medication)  and Aspirin  81mg currently being taken    not  recommended or prescribed by  your physician  should be held for 5  days prior to biopsy.  TAKES EXCEDRIN AND IBUPROFEN PRN- WILL HOLD.   -Aspirin 81 mg being taken related to a cardiac condition  or prescribed by your  physician should be held at the  direction of your physician.  Informed patient to call ordering physician for guidelines   PATIENT DENIES USAGE    -Blood thinners/antiplatelet medications (Coumadin, Plavix  ect) should be held at the  direction of your physician.  Informed patient to call ordering physician for guidelines   PATIENT DENIES USAGE   Reviewed Stereotactic biopsy procedure, as below.  For this procedure,   stereotactic biopsy is being performed with tomosynthesis , you will sitting upright  with your breast in compression.  Mammography imaging will be used to locate the area in question that was seen on your previous breast imaging.  The Radiologist will then inject a local numbing medication into the area and then use a needle to collect cells from the site.  A marker, or clip, will then be placed in the biopsied area.  This marker is placed so this biopsy site is able to be accurately located upon future breast imaging.  After the clip is placed,  a dressing will be placed on the biopsy site.  Additional mammography films will then be taken to assure correct placement of the marker.    Educated the patient they will be awake during this procedure and are able to drive themselves home if they wish.    Educated patient that some soreness may occur after biopsy.  Discussed  use of a supportive bra and ice packs after procedure, to decrease soreness.    Discussed with patient no swimming, bathing,  hot tubs , or submerging underwater for 5 days and   until the incision is closed and healed.  Educated patient on lifting restrictions - nothing heavier than a gallon of milk for  48 hours after the procedure.      Discussed with patient that some soreness and bruising is normal after biopsy but that prolonged or increased pain and bruising should be reported to the ordering physician.  An ace wrap will be applied over bra for added support and should be left on for 24 hours post procedure.  Reviewed results process with patient and shared that pathology results will be available within 2-3 business days of their biopsy.  Discussed results will be communicated by their ordering physician unless otherwise indicated.  Educated patient that once we receive an order from their physician our radiology secretaries will be calling them to schedule their biopsy.

## 2024-07-16 ENCOUNTER — LAB ENCOUNTER (OUTPATIENT)
Dept: LAB | Age: 64
End: 2024-07-16
Attending: INTERNAL MEDICINE
Payer: MEDICARE

## 2024-07-16 ENCOUNTER — OFFICE VISIT (OUTPATIENT)
Dept: RHEUMATOLOGY | Facility: CLINIC | Age: 64
End: 2024-07-16

## 2024-07-16 VITALS — WEIGHT: 192 LBS | HEIGHT: 62 IN | BODY MASS INDEX: 35.33 KG/M2

## 2024-07-16 DIAGNOSIS — E03.8 OTHER SPECIFIED HYPOTHYROIDISM: ICD-10-CM

## 2024-07-16 DIAGNOSIS — M51.34 DDD (DEGENERATIVE DISC DISEASE), THORACIC: ICD-10-CM

## 2024-07-16 DIAGNOSIS — N18.30 STAGE 3 CHRONIC KIDNEY DISEASE, UNSPECIFIED WHETHER STAGE 3A OR 3B CKD (HCC): ICD-10-CM

## 2024-07-16 DIAGNOSIS — E78.00 PURE HYPERCHOLESTEROLEMIA: ICD-10-CM

## 2024-07-16 DIAGNOSIS — M25.50 POLYARTHRALGIA: ICD-10-CM

## 2024-07-16 DIAGNOSIS — M25.50 POLYARTHRALGIA: Primary | ICD-10-CM

## 2024-07-16 DIAGNOSIS — M51.36 DDD (DEGENERATIVE DISC DISEASE), LUMBAR: ICD-10-CM

## 2024-07-16 DIAGNOSIS — M50.30 DDD (DEGENERATIVE DISC DISEASE), CERVICAL: ICD-10-CM

## 2024-07-16 LAB
C3 SERPL-MCNC: 140.2 MG/DL (ref 90–170)
C4 SERPL-MCNC: 34.8 MG/DL (ref 12–36)
CHOLEST SERPL-MCNC: 221 MG/DL (ref ?–200)
CRP SERPL-MCNC: <0.4 MG/DL (ref ?–1)
ERYTHROCYTE [SEDIMENTATION RATE] IN BLOOD: 43 MM/HR
FASTING PATIENT LIPID ANSWER: YES
HDLC SERPL-MCNC: 48 MG/DL (ref 40–59)
LDLC SERPL CALC-MCNC: 139 MG/DL (ref ?–100)
NONHDLC SERPL-MCNC: 173 MG/DL (ref ?–130)
RHEUMATOID FACT SERPL-ACNC: 7.8 IU/ML (ref ?–14)
TRIGL SERPL-MCNC: 189 MG/DL (ref 30–149)
TSI SER-ACNC: 1.37 MIU/ML (ref 0.55–4.78)
VIT D+METAB SERPL-MCNC: 24.4 NG/ML (ref 30–100)
VLDLC SERPL CALC-MCNC: 35 MG/DL (ref 0–30)

## 2024-07-16 PROCEDURE — 86200 CCP ANTIBODY: CPT | Performed by: INTERNAL MEDICINE

## 2024-07-16 PROCEDURE — 99215 OFFICE O/P EST HI 40 MIN: CPT | Performed by: INTERNAL MEDICINE

## 2024-07-16 PROCEDURE — 84443 ASSAY THYROID STIM HORMONE: CPT

## 2024-07-16 PROCEDURE — 36415 COLL VENOUS BLD VENIPUNCTURE: CPT

## 2024-07-16 PROCEDURE — G2211 COMPLEX E/M VISIT ADD ON: HCPCS | Performed by: INTERNAL MEDICINE

## 2024-07-16 PROCEDURE — 86140 C-REACTIVE PROTEIN: CPT

## 2024-07-16 PROCEDURE — 86160 COMPLEMENT ANTIGEN: CPT

## 2024-07-16 PROCEDURE — 85652 RBC SED RATE AUTOMATED: CPT

## 2024-07-16 PROCEDURE — 86431 RHEUMATOID FACTOR QUANT: CPT | Performed by: INTERNAL MEDICINE

## 2024-07-16 PROCEDURE — 82306 VITAMIN D 25 HYDROXY: CPT | Performed by: INTERNAL MEDICINE

## 2024-07-16 PROCEDURE — 86038 ANTINUCLEAR ANTIBODIES: CPT | Performed by: INTERNAL MEDICINE

## 2024-07-16 PROCEDURE — 80061 LIPID PANEL: CPT

## 2024-07-16 RX ORDER — DIAZEPAM 10 MG/1
10 TABLET ORAL DAILY PRN
COMMUNITY

## 2024-07-16 RX ORDER — BUTALBITAL, ASPIRIN, AND CAFFEINE 325; 50; 40 MG/1; MG/1; MG/1
1 CAPSULE ORAL EVERY 4 HOURS PRN
COMMUNITY

## 2024-07-16 RX ORDER — TRAZODONE HYDROCHLORIDE 150 MG/1
300 TABLET ORAL EVERY EVENING
COMMUNITY

## 2024-07-16 NOTE — PROGRESS NOTES
RHEUMATOLOGY CLINIC- NEW PATIENT    Elin Walker is a 64 year old female.    ASSESSMENT/PLAN:       ICD-10-CM    1. Polyarthralgia  M25.50 C-Reactive Protein     Sed Rate, Westergren (Automated)     Anti-Nuclear Antibody (RUBIN) by IFA, Reflex Titer + Specific Antibodies     Complement C3, Serum     Complement C4, Serum     Cyclic Citrullinate Pep. IGG     Rheumatoid Arthritis Factor      2. DDD (degenerative disc disease), cervical  M50.30 C-Reactive Protein     Sed Rate, Westergren (Automated)     Anti-Nuclear Antibody (RUBIN) by IFA, Reflex Titer + Specific Antibodies     Complement C3, Serum     Complement C4, Serum     Cyclic Citrullinate Pep. IGG     Rheumatoid Arthritis Factor      3. DDD (degenerative disc disease), thoracic  M51.34 C-Reactive Protein     Sed Rate, Westergren (Automated)     Anti-Nuclear Antibody (RUBIN) by IFA, Reflex Titer + Specific Antibodies     Complement C3, Serum     Complement C4, Serum     Cyclic Citrullinate Pep. IGG     Rheumatoid Arthritis Factor      4. DDD (degenerative disc disease), lumbar  M51.36 C-Reactive Protein     Sed Rate, Westergren (Automated)     Anti-Nuclear Antibody (RUBIN) by IFA, Reflex Titer + Specific Antibodies     Complement C3, Serum     Complement C4, Serum     Cyclic Citrullinate Pep. IGG     Rheumatoid Arthritis Factor      5. Other specified hypothyroidism  E03.8 C-Reactive Protein     Sed Rate, Westergren (Automated)     Anti-Nuclear Antibody (RUBIN) by IFA, Reflex Titer + Specific Antibodies     Complement C3, Serum     Complement C4, Serum     Cyclic Citrullinate Pep. IGG     Rheumatoid Arthritis Factor      6. Stage 3 chronic kidney disease, unspecified whether stage 3a or 3b CKD ([facility])  N18.30 C-Reactive Protein     Sed Rate, Westergren (Automated)     Anti-Nuclear Antibody (RUBIN) by IFA, Reflex Titer + Specific Antibodies     Complement C3, Serum     Complement C4, Serum     Cyclic Citrullinate Pep. IGG     Rheumatoid Arthritis Factor         DISCUSSION:  Patient presents as a new outpatient referral from her PCP for evaluation of longstanding back pain.  No synovitis on current exam.  Symptoms aggravated with activity and worse towards the end of the day Ni suggestive of underlying mechanical pain.  Prior x-rays reviewed and without inflammatory changes.  Rather, degenerative changes noted.  Additionally, she was recently prescribed a prednisone course for COVID which not improve her arthralgias.  However, patient notes a family history of autoimmune conditions, therefore, we will workup further as above.  Would keep in mind patient's history of thyroid disease/Hashimoto's which can result in a cross-reactive RUBIN.  Patient is also interested in a posture correction device.  DME order sent to patient's pharmacy.  Also discussed OTC options via [employer].    PLAN:  -Patient to obtain the above nonfasting lab work  - Next treatment options pending results  - Consult/evaluation communicated with referring physician/provider.    I will communicate with the patient regarding above results.    Chris Chambers,   7/16/2024   1:26 PM  There is a longitudinal care relationship with me, the care plan reflects the ongoing nature of the continuous relationship of care, and the medical record indicates that there is ongoing treatment of a serious/complex medical condition which I am currently managing.  is Applicable.     HPI:     7/16/24 Initial Consult: I had the pleasure of seeing Elin Walker on 7/16/2024 as a new outpatient consultation for polyarthralgias. The patient was originally referred by her PCP, Dr. Fam Cerna.     64 year old female w/ PMH migraines, emphysema, CKD, HLD, vitamin D Deficiency, hypothyroidism who presents to clinic today.  Patient reporting years of back pain, with symptoms worse as the day progresses.  She states that she \"feels like my back is caving in on itself \".  The only thing that helps her symptoms is laying  flat on heating pad and resting.  Also has neck pain with associated migraines.  She has had epidural CSI's in the past without much relief of symptoms.  She was recently prescribed a burst of prednisone after zully COVID which helped her sinus congestion but did not help her arthralgias.  Notes a family history of a maternal grandmother with an autoimmune condition as well as osteoarthritis in the family.  Is interested in attempting a trial of a back brace/posture control.  Otherwise, no known family history of lupus, gout, rheumatoid thyroiditis, psoriasis.  ROS negative for unexplained fever, chills, unintentional weight loss, photosensitive rash, Raynaud's phenomenon, history of serositis, history uveitis/iritis.      Current Medications:  N/A    Medication History:  P.o. prednisone 40 mg daily x 5 days-prescribed 7/11/2024 per her PCP w/o relief of her polyarthralgias    Interval History:   N/A    HISTORY:  No past medical history on file.   Social Hx Reviewed   Family Hx Reviewed     Medications (Active prior to today's visit):  Current Outpatient Medications   Medication Sig Dispense Refill    albuterol 108 (90 Base) MCG/ACT Inhalation Aero Soln Inhale 2 puffs into the lungs every 4 (four) hours as needed for Wheezing or Shortness of Breath. 18 g 5    doxycycline 100 MG Oral Cap Take 1 capsule (100 mg total) by mouth 2 (two) times daily for 7 days. 14 capsule 0    predniSONE 20 MG Oral Tab Take 1 tablet (20 mg total) by mouth 2 (two) times daily for 5 days. 10 tablet 0    benzonatate 200 MG Oral Cap Take 1 capsule (200 mg total) by mouth 3 (three) times daily as needed for cough. 30 capsule 0    levothyroxine 150 MCG Oral Tab Take 1 tablet (150 mcg total) by mouth before breakfast. 1 hour .  Discontinue levothyroxine 200 mcg daily. 90 tablet 0    Albuterol Sulfate  (90 Base) MCG/ACT Inhalation Aero Soln Inhale 2 puffs into the lungs every 4 to 6 hours as needed.      atorvastatin 40 MG Oral Tab  Take 1 tablet (40 mg total) by mouth daily.      SYMBICORT 160-4.5 MCG/ACT Inhalation Aerosol Inhale 2 puffs into the lungs 2 (two) times daily.      D3-50 1.25 MG (97869 UT) Oral Cap TAKE ONE CAPSULE BY MOUTH ONCE A WEEK WITH MEALS      FLUoxetine 20 MG Oral Cap TAKE 3 CAPSULES BY MOUTH EVERY DAY IN THE EVENING      zolpidem 10 MG Oral Tab Take 2 tablets (20 mg total) by mouth every evening.         Allergies:  Allergies   Allergen Reactions    Penicillin G OTHER (SEE COMMENTS)     itchiness         ROS:   Review of Systems   Constitutional:  Negative for chills and fever.   HENT:  Negative for congestion, hearing loss, mouth sores, nosebleeds and trouble swallowing.    Eyes:  Negative for photophobia, pain, redness and visual disturbance.   Respiratory:  Negative for cough and shortness of breath.    Cardiovascular:  Negative for chest pain, palpitations and leg swelling.   Gastrointestinal:  Negative for abdominal pain, blood in stool, diarrhea and nausea.   Endocrine: Negative for cold intolerance and heat intolerance.   Genitourinary:  Negative for dysuria, frequency and hematuria.   Musculoskeletal:  Positive for arthralgias, back pain, gait problem, neck pain and neck stiffness. Negative for joint swelling and myalgias.   Skin:  Negative for color change and rash.   Neurological:  Negative for dizziness, weakness, numbness and headaches.   Psychiatric/Behavioral:  Negative for confusion and dysphoric mood.         PHYSICAL EXAM:     Constitutional:  Well developed, Well nourished, No acute distress  HENT:  Normocephalic, Atraumatic, Bilateral external ears normal, Oropharynx moist, No oral exudates.  Neck: Normal range of motion, No tenderness, Supple, No stridor.  Eyes:  PERRL, EOMI, Conjunctiva normal, No discharge.  GI:  Bowel sounds normal, Soft, No tenderness, No masses, No pulsatile masses.  : No CVA tenderness.  Musculoskeletal:  A comprehensive 28 count joint exam was done and was negative for  swelling or tenderness except as noted. Inspections for misalignment, asymmetry, crepitation, defects, tenderness, masses, nodules, effusions, range of motion, and stability in the upper and lower extremities bilaterally are all normal unless noted.           Integument:  Warm, Dry, No erythema, No rash.  Lymphatic:  No lymphadenopathy noted.  Neurologic:  Alert & oriented x 3, Normal motor function, Normal sensory function, No focal deficits noted.  Psychiatric:  Affect normal, Judgment normal, Mood normal.    LABS:     Prior lab work reviewed and notable for:    4/10/2024:  CBC without cytopenias or leukocytosis  CMP with BUN 16, CR 1.33, LFTs not elevated, no gamma gap noted  TSH 0.173 (low), free T41.7 WNL, free T32.66 WNL  CK 34 WNL  Imagin/20/24 cervical spine/lumbar spine XR:  Cervical Impression   CONCLUSION:     Degenerative disc, facet, uncovertebral joint disease throughout the cervical spine without acute fracture or dislocation.       Lumbar Impression   CONCLUSION:     Degenerative disc and facet disease throughout the lumbar spine, most prominent at L5-S1.     Limited range of motion on flexion and extension views.     23 MRI Thoracic Spine:   FINDINGS:     Motion degraded examination. Counting sequences demonstrate incidental note   of degenerative changes of the cervical and lumbar spine. Exaggerated   kyphosis of the thoracic spine. No acute malalignment of the thoracic   spine. There are mild chronic compression fractures of the T3 and T4   vertebral bodies. No acute/recent compression fracture within the thoracic   spine. There is nonspecific heterogeneous T1 marrow signal intensity,   findings may be related to bone demineralization. There are some Schmorl's   nodes and scattered Modic endplate changes. No suspicious thoracic cord   signal abnormality. There is some facet joint arthropathy. There is   degenerative disc disease. No sizable epidural hematoma. No significant   neural  foraminal or spinal canal stenosis within the thoracic spine.   Atelectasis/infiltrates within the included posterior lungs.     IMPRESSION:     1. No acute/recent compression fracture within the thoracic spine. Mild   chronic compression deformities of the T3 and T4 vertebral bodies without   retropulsion.   2. Exaggerated kyphosis and degenerative changes of the thoracic spine   without significant neural foraminal or spinal canal stenosis.   3. No suspicious thoracic cord signal abnormality. No sizable epidural   hematoma.     5/7/23 CT Lumbar:   IMPRESSION:     1. No evidence of traumatic injury involving the thoracic aorta or   mediastinal hematoma.     2.  Irregular hazy opacities throughout the lungs, suggestive of   postinflammatory scarring/fibrosis.  Dependent atelectasis is also present   in the lower lungs.     3.  No evidence of lung contusion or pneumothorax.     4.  No acute traumatic injury identified within the abdomen/pelvis.      5.  Mild compression deformity superior endplate of T3 vertebral body, age   indeterminate.  No significant loss of vertebral body height or   retropulsion.      6.  No compression fracture or malalignment of the lumbar spine.      7.  1 cm nodule right middle lobe.  Further evaluation with PET/CT   recommended.     6/28/22 B/L Hip/Pelvic XR:   Impression      1. Bone demineralization without acute fracture line or acute malalignment  of the lumbar spine and bilateral hips.  2. Degenerative changes of the lumbar spine and hips.

## 2024-07-17 ENCOUNTER — TELEPHONE (OUTPATIENT)
Dept: OBGYN CLINIC | Facility: CLINIC | Age: 64
End: 2024-07-17

## 2024-07-17 NOTE — DISCHARGE INSTRUCTIONS
The Doctor (Radiologist) who performed your procedure was: DR LAWRENCE    Place an ice pack over the biopsy site on top of your bra or on top of the ACE wrap (never apply ice directly over skin) for 10-15 minutes of every hour until bedtime for your comfort and to decrease bleeding.  Keep your sports bra or the ACE wrap (stereotactic and MRI biopsy) in place for 24 hours after your biopsy. This compression decreases bleeding and breast movement for your comfort. Wear a supportive bra for the next couple of days for comfort (sports bra for sleep).   Continue to wear, preferably, a sports bra or good supportive bra for 1 week and take off only to shower.  No baths or showers during the first 24 hours after biopsy. After this time you may take a shower. It's okay if the strips get wet but do not soak them. NO saunas, hot tubs or swimming until steri-strips fall off (approx. 5 days). This prevents infection and allows time for them to completely close and heal.  DO NOT remove the steri-strips. They will fall off in 5 days. If any type of irritation (redness, itching or blisters) develops in the area around the steri-strips, remove them gently. If the steri-strips do not fall off after 5 days, gently remove them. Keep the area clean and dry.  It is normal to have mild discomfort and bruising at the biopsy site.  You may take Tylenol as needed for discomfort, as long as you have no allergies to Tylenol. Do not take aspirin, motrin, ibuprofen or any medication containing NSAID (non-steroidal anti-inflammatory drug) product for 48 hours.  DO NOT participate in strenuous activity (aerobics, heavy lifting, housework, gardening, etc.) 48 hours after your biopsy to prevent bleeding.  You will receive results in 2-3 business days.  If you are having an MRI breast biopsy or an Ultrasound guided breast biopsy, you will be billed for the biopsy and unilateral mammogram separately.  If you have any questions about the procedure or  your results, please contact the Breast Care Coordinator Nurse at [phone].  Notify your ordering physician or primary physician for increased bleeding, pain or fever over 100. Or contact a Radiology Nurse at [phone] between 8am-4pm (after 4pm, your call will be directed to the [facility]).

## 2024-07-17 NOTE — TELEPHONE ENCOUNTER
Left message to call back.    Kajal Boone MD  7/16/2024  4:36 PM CDT       Please notify Elin that her pap smear is normal with negative HPV. Repeat pap smear can be completed in 3 years, but I still recommend an exam annually.

## 2024-07-18 ENCOUNTER — TELEPHONE (OUTPATIENT)
Dept: RHEUMATOLOGY | Facility: CLINIC | Age: 64
End: 2024-07-18

## 2024-07-18 ENCOUNTER — TELEPHONE (OUTPATIENT)
Dept: FAMILY MEDICINE CLINIC | Facility: CLINIC | Age: 64
End: 2024-07-18

## 2024-07-18 LAB
CCP IGG SERPL-ACNC: 1 U/ML (ref 0–6.9)
NUCLEAR IGG TITR SER IF: NEGATIVE {TITER}

## 2024-07-18 NOTE — TELEPHONE ENCOUNTER
Patient notified of negative pap and negative human papillomavirus. Plan to return annually and pap in 3 years. Patient verbalized understanding.

## 2024-07-18 NOTE — TELEPHONE ENCOUNTER
Encounter closed.     Future Appointments   Date Time Provider Department Center   7/24/2024 10:00 AM [facility] RM3 [facility] EM [facility]   8/5/2024  3:00 PM Jayleen Grier APRN [facility]   8/21/2024  1:20 PM Linda Foster DO ENIELHUR [place] [facility]   9/13/2024  2:45 PM Hermelinda Bhatia MD [id number] [facility] [place]

## 2024-07-18 NOTE — TELEPHONE ENCOUNTER
Talked to patient told her message below, and she says she will do all her test first then she will call back. She says that she talked to a nurse already.

## 2024-07-18 NOTE — PROGRESS NOTES
mammogram and Us - results -   Patient need biopsy- left breast -   appointment is scheduled already on   7/24/2024       Also needed -follow-up diagnostic mammogram and ultrasound left breast in 6 months-order placed in the system    SHORT TERM FOLLOW-UP DIAGNOSTIC MAMMOGRAM LEFT BREAST IN 6 MONTHS.      SHORT TERM FOLLOW-UP ULTRASOUND LEFT BREAST IN 6 MONTHS.       Patient to schedule follow-up visit in 1 month

## 2024-07-23 RX ORDER — LEVOTHYROXINE SODIUM 0.15 MG/1
150 TABLET ORAL
Qty: 90 TABLET | Refills: 3 | Status: SHIPPED | OUTPATIENT
Start: 2024-07-23

## 2024-07-23 NOTE — TELEPHONE ENCOUNTER
Refill passes per [facility] protocol.    No future appointments with family medicine/internal medicine.  LAST OFFICE VISIT: 5/1/24    Requested Prescriptions   Pending Prescriptions Disp Refills    LEVOTHYROXINE 150 MCG Oral Tab [Pharmacy Med Name: LEVOTHYROXINE 0.150MG (150MCG) TAB] 90 tablet 0     Sig: TAKE 1 TABLET(150 MCG) BY MOUTH 1 HOUR BEFORE BREAKFAST. DISCONTINUE LEVOTHYROXINE 200 MCG DAILY       Thyroid Medication Protocol Passed - 7/19/2024  7:46 AM        Passed - TSH in past 12 months        Passed - Last TSH value is normal     Lab Results   Component Value Date    TSH 1.366 07/16/2024                 Passed - In person appointment or virtual visit in the past 12 mos or appointment in next 3 mos     Recent Outpatient Visits              1 week ago Polyarthralgia    [facility], [address], [place] Chris Chambers DO    Office Visit    1 week ago Well woman exam with routine gynecological exam    [facility], [address], [place] - OB/GYN Kajal Boone MD    Office Visit    1 week ago Other emphysema (HCC)    [facility], [address], [place] Kaley Vela APRN    Telemedicine    3 weeks ago COVID-19    [facility], [address], [place] Kaley Vela APRN    Virtual Phone E/M    1 month ago Chronic pain syndrome    [facility], [address], [place] Beni Kirk DO    Office Visit          Future Appointments         Provider Department Appt Notes    Tomorrow EM RADIOLOGIST BREAST; [facility] RM3 [facility] q[facility]ap    In 1 week Jayleen Grier APRN [facility] CLN screen   (Policy informed)    In 1 month Hermelinda Bhatia MD [facility], [address], [place] LMTCB / MyChart sent to rs appt 7/19/2024 refugio   Other emphysema (HCC)                         Future Appointments         Provider Department Appt  Notes    Tomorrow EM RADIOLOGIST BREAST; [facility] RM3 [facility] qa-ap    In 1 week Jayleen Grier APRN [facility] CLN screen   (Policy informed)    In 1 month Hermelinda Bhatia MD [facility], [address], [place] LMTCB / MyChart sent to  appt 7/19/2024 refugio   Other emphysema (HCC)          Recent Outpatient Visits              1 week ago Polyarthralgia    [facility], [address], [place] Chris Chambers,     Office Visit    1 week ago Well woman exam with routine gynecological exam    [facility], [address], [place] - OB/GYN Kajal Boone MD    Office Visit    1 week ago Other emphysema (HCC)    [facility], [address], [place] Kaley Vela APRN    Telemedicine    3 weeks ago COVID-19    [facility], [address], [place] Kaley Vela APRN    Virtual Phone E/M    1 month ago Chronic pain syndrome    [facility], [address], [place] Beni Kirk DO    Office Visit

## 2024-07-24 ENCOUNTER — HOSPITAL ENCOUNTER (OUTPATIENT)
Dept: MAMMOGRAPHY | Facility: HOSPITAL | Age: 64
Discharge: HOME | End: 2024-07-24
Attending: INTERNAL MEDICINE
Payer: MEDICARE

## 2024-07-24 DIAGNOSIS — R92.1 BREAST CALCIFICATION, LEFT: ICD-10-CM

## 2024-07-24 PROCEDURE — 19081 BX BREAST 1ST LESION STRTCTC: CPT | Performed by: INTERNAL MEDICINE

## 2024-07-24 PROCEDURE — 88305 TISSUE EXAM BY PATHOLOGIST: CPT | Performed by: INTERNAL MEDICINE

## 2024-07-24 NOTE — IMAGING NOTE
History taken and as follows: CONCLUSION:     1. Calcifications which appear to be in a linear distribution in the left breast upper outer quadrant are suspicious.  Stereotactic/tomosynthesis guided biopsy is recommended     1005  Dr LAWRENCE  here to explain risk and benefits of procedure. Questions answered by the dutch    0936  Pt consented at  SITE MARKED LEFT   Breast    Placed in chair  at  1004  scouts taken    1007 Time out taken    1011 Chloro prep as skin prep.   Lidocaine 1% 10  Milligrams per ml 5 ml given for superficial anesthetic affect at 1012      1013 Lidocaine  1% with epinephrine  1:100,000 units for deeper anesthetic affect 15 ML given.  More images taken after local  was given.    Sampling begins at 1014     Sampling complete at  1016  Core tainer taken to be imaged.    1018  Formalin added to samples.    1017   x clip inserted . Procedure completed . Pressure to site manually by nurse  and by machine compression for 10 minutes .    No active bleeding noted.  Area cleaned steri strips to site . Ice pack to site .     1039 Cores taken to pathology by CONNIE [facility] RADHA DEL REAL     WHEN  post clip images  completed  Dressing dry and intact . Nipple markers removed by STAFF  Bra applied per patient. 6\" ace secured over bra by STAFF       0940 Post instructions  Given verbal et written AVS  summary sheet provided to patient. Patient verbalizes understanding and agreement.    PT TO BE  discharged BY MAMMO STAFF

## 2024-07-24 NOTE — PROCEDURES
[facility]  part of [facility]  Procedure Note    Elin Walker Patient Status:  Outpatient    1960 MRN [id number]   Location [facility] MAMMOGRAPHY - [place] [facility] Attending Fam Cerna MD   Hosp Day # 0 PCP Fam Cerna MD     Procedure: Left breast stereotactic/jaki guided biopsy    Pre-Procedure Diagnosis:  Left breast calcifications    Post-Procedure Diagnosis: Left breast calcifications    Anesthesia:  Local    Findings:  Left breast calcifications    Specimens: multiple cores    Blood Loss:  minimal    Tourniquet Time: none  Complications:  None  Drains:  none        Elise Iyer MD  2024      
no

## 2024-07-26 ENCOUNTER — TELEPHONE (OUTPATIENT)
Dept: ULTRASOUND IMAGING | Facility: HOSPITAL | Age: 64
End: 2024-07-26

## 2024-07-26 ENCOUNTER — CLINICAL ABSTRACT (OUTPATIENT)
Dept: CARE COORDINATION | Age: 64
End: 2024-07-26

## 2024-07-26 NOTE — TELEPHONE ENCOUNTER
Elin Walker is s/p biopsy .  Phoned and introduced myself as breast coordinator .  Reinforced to patient  post biopsy care and instructions .c/o a lot pain yesterday relieved after ace wrap removed . No c/o pain today    Informed  and shared the pathology results as well as the recommendations from Dr Iyer for her breast imaging  as follows:      Pathology results shared (see epic for dictated pathology and radiology procedure report)  and recommendations are as follows:          Pathology demonstrates benign findings and is concordant with imaging.       RECOMMENDATION:  Per the 7/15/2024 report, a six-month follow-up left breast diagnostic mammogram and left breast ultrasound was recommended             Elin Walkeracknowledges the above and denies questions. Elin Walker was also instructed to perform breast self exams and if any changes  develops any changes to contact ordering  physician immediately  for re evaluation..  Elin Walkerverbalizes understanding and agreement.

## 2024-08-01 ENCOUNTER — TELEPHONE (OUTPATIENT)
Dept: PULMONOLOGY | Facility: CLINIC | Age: 64
End: 2024-08-01

## 2024-08-01 NOTE — TELEPHONE ENCOUNTER
Patient originally scheduled with Pulmonologist on 9/13/2024 but due to dr schedule change, patient was contacted to reschedule appointment.  Patient was offered 11/29/2024 but patient declined.  Patient requesting to be seen sooner for Consult regarding Emphysema.  Please call.  Thank you.

## 2024-08-05 ENCOUNTER — TELEPHONE (OUTPATIENT)
Dept: INTERNAL MEDICINE CLINIC | Facility: CLINIC | Age: 64
End: 2024-08-05

## 2024-08-05 NOTE — TELEPHONE ENCOUNTER
Patient called office. Date of birth and full name both confirmed.   She clarifies that it was a Susana that reached out to her.   Chart reviewed - Pulmonology RN   Warm-transferred call to susana RN  - prior to transfer, patient also asking to speak with Gastroenterology, RN asked that Susana RN assist with this after the call. She agrees.

## 2024-08-05 NOTE — TELEPHONE ENCOUNTER
Patient scheduled for 9/24/24 at 4pm. Reviewed time, date, place and where to park. Patient verbalized understanding

## 2024-08-05 NOTE — TELEPHONE ENCOUNTER
Patient states that she is calling \"April\" back from Dr. Cerna's office. I was unable to find an encounter from \"April\". She was unable to tell me what it was concerning. I advised her to listen to her voicemail and call us back. She verbalized understanding.

## 2024-08-06 ENCOUNTER — NURSE TRIAGE (OUTPATIENT)
Dept: INTERNAL MEDICINE CLINIC | Facility: CLINIC | Age: 64
End: 2024-08-06

## 2024-08-06 RX ORDER — KETOCONAZOLE 20 MG/G
CREAM TOPICAL
Qty: 60 G | Refills: 1 | Status: SHIPPED | OUTPATIENT
Start: 2024-08-06

## 2024-08-06 NOTE — TELEPHONE ENCOUNTER
Noted nystatin cream twice daily for 1 to 2 weeks sent to the pharmacy  Follow-up visit  recommended

## 2024-08-06 NOTE — TELEPHONE ENCOUNTER
Advised patient of Dr Cerna's note. Patient verbalized understanding and had no further questions.

## 2024-08-06 NOTE — TELEPHONE ENCOUNTER
Action Requested: Summary for Provider     []  Critical Lab, Recommendations Needed  [x] Need Additional Advice  []   FYI    []   Need Orders  [x] Need Medications Sent to Pharmacy  []  Other     SUMMARY: Patient asking if  could prescribe an antifungal cream for rash/soreness she gets from time to time under her breasts and groin folds.  Patient currently has this rash, is very bothersome.     Patient states she use to be prescribed a cream by her former doctor, unknown kind.     Patient asking for call back when medication sent.     Reason for call: Refill Request and Rash Skin Problem (Fungal rash under breasts and groin area. )  Onset: past 1 week  Reason for Disposition   Red, moist, irritated area between skin folds (or under larger breasts)    Protocols used: Rash or Redness - Xxtgsqsky-A-RB

## 2024-08-07 ENCOUNTER — TELEPHONE (OUTPATIENT)
Dept: INTERNAL MEDICINE CLINIC | Facility: CLINIC | Age: 64
End: 2024-08-07

## 2024-08-07 ENCOUNTER — E-ADVICE (OUTPATIENT)
Dept: INTERNAL MEDICINE | Age: 64
End: 2024-08-07

## 2024-10-01 ENCOUNTER — NURSE TRIAGE (OUTPATIENT)
Dept: INTERNAL MEDICINE CLINIC | Facility: CLINIC | Age: 64
End: 2024-10-01

## 2024-10-01 DIAGNOSIS — R21 RASH: Primary | ICD-10-CM

## 2024-10-01 NOTE — TELEPHONE ENCOUNTER
Sorry  I am completely  booked   Patient needs to be seen for examination   Can be seen In  immediate care  today or tomorrow  approved referral to see the dermatologist-  can schedule appointment.

## 2024-10-01 NOTE — TELEPHONE ENCOUNTER
Action Requested: Summary for Provider     []  Critical Lab, Recommendations Needed  [x] Need Additional Advice  []   FYI    []   Need Orders  [] Need Medications Sent to Pharmacy  []  Other     SUMMARY: Please advise. Patient asking if  can see her,no available appointment. Patient states she needs to see a dermatologist in [place] location only.     For past few days patient has widespread itching. Patient does not see a rash. There are some small black spots on her back when she looks in the mirror.No wheezing or lip swelling. Only new changes is patient recently had her Prozac increased from 3 capsules daily to 4 capsules lorena    Patient advised office appointment, asking for message to be sent to doctor.    Patient refusing to take over the counter antihistamine because she doesn't want it to interfere with her other medications.    Reason for call: Itching    Reason for Disposition   Widespread itching and cause unknown and present > 48 hours    Protocols used: Itching - Widespread-A-OH

## 2024-10-01 NOTE — TELEPHONE ENCOUNTER
Spoke with the patient,verified full name and     Informed her of message and assisted with Dr. Cerna next appointment.    Provide her with referral information to schedule appointment [phone] Dr. Kee    Future Appointments   Date Time Provider Department Center   2024  3:20 PM Fam Cerna MD [id number] [place]

## 2024-10-09 ENCOUNTER — TELEPHONE (OUTPATIENT)
Dept: INTERNAL MEDICINE CLINIC | Facility: CLINIC | Age: 64
End: 2024-10-09

## 2024-10-28 DIAGNOSIS — J43.8 OTHER EMPHYSEMA (HCC): ICD-10-CM

## 2024-10-30 ENCOUNTER — NURSE TRIAGE (OUTPATIENT)
Dept: INTERNAL MEDICINE CLINIC | Facility: CLINIC | Age: 64
End: 2024-10-30

## 2024-10-30 NOTE — TELEPHONE ENCOUNTER
Action Requested: Summary for Provider     []  Critical Lab, Recommendations Needed  [] Need Additional Advice  []   FYI    []   Need Orders  [] Need Medications Sent to Pharmacy  []  Other     SUMMARY: Appointment scheduled for tomorrow 10/31/24 at 3:00 PM.     Reason for call: Abdominal Pain  Onset: Oct 28, 2024    Spoke to patient, full name and date of birth verified.   Patient reports she has intermittent pain under her right rib area, off to the right side, lower than right axillary for 3 days.    Patient denies recent illness, describes a sharp stabbing pain that is intermittent, only when she takes a deep breath or moves the wrong way.     Patient has been treating with Tylenol.   Patient also has chronic back pain.     There are no available appointments in [place], patient accepted appointment in [place] because it is closest.    Patient declined earlier appointment with APRN, patient only wishes to see MD.  Patient stated she is disabled and does not want to go far from home.   Declines Immediate Care.      Patient has scheduled appointment next week with Dr. Cerna but wishes to be seen sooner by MD.     Reason for Disposition   MODERATE pain that comes and goes (cramps) lasts > 24 hours    Protocols used: Abdominal Pain - Upper-A-OH

## 2024-10-31 ENCOUNTER — TELEPHONE (OUTPATIENT)
Facility: LOCATION | Age: 64
End: 2024-10-31

## 2024-10-31 RX ORDER — BENZONATATE 200 MG/1
200 CAPSULE ORAL 3 TIMES DAILY PRN
Qty: 30 CAPSULE | Refills: 0 | OUTPATIENT
Start: 2024-10-31

## 2024-10-31 NOTE — TELEPHONE ENCOUNTER
Patient states she returning call about her appointment.  Needs to reschedule. Patient does not understand why.  Spoke with [facility] onsite staff regarding appointment for today.  Call transferred to provide additional information to Patient.

## 2024-11-01 ENCOUNTER — APPOINTMENT (OUTPATIENT)
Dept: CT IMAGING | Age: 64
End: 2024-11-01
Attending: NURSE PRACTITIONER
Payer: MEDICARE

## 2024-11-01 ENCOUNTER — HOSPITAL ENCOUNTER (OUTPATIENT)
Age: 64
Discharge: HOME | End: 2024-11-01
Payer: MEDICARE

## 2024-11-01 VITALS
SYSTOLIC BLOOD PRESSURE: 110 MMHG | DIASTOLIC BLOOD PRESSURE: 68 MMHG | RESPIRATION RATE: 18 BRPM | HEART RATE: 76 BPM | TEMPERATURE: 97 F | OXYGEN SATURATION: 97 %

## 2024-11-01 DIAGNOSIS — M54.9 BACK PAIN WITHOUT RADIATION: Primary | ICD-10-CM

## 2024-11-01 LAB
#MXD IC: 0.6 X10ˆ3/UL (ref 0.1–1)
BILIRUB UR QL STRIP: NEGATIVE
BUN BLD-MCNC: 27 MG/DL (ref 7–18)
CHLORIDE BLD-SCNC: 106 MMOL/L (ref 98–112)
CLARITY UR: CLEAR
CO2 BLD-SCNC: 22 MMOL/L (ref 21–32)
COLOR UR: YELLOW
CREAT BLD-MCNC: 1.5 MG/DL
EGFRCR SERPLBLD CKD-EPI 2021: 39 ML/MIN/1.73M2 (ref 60–?)
GLUCOSE BLD-MCNC: 106 MG/DL (ref 70–99)
GLUCOSE UR STRIP-MCNC: NEGATIVE MG/DL
HCT VFR BLD AUTO: 43 %
HCT VFR BLD CALC: 44 %
HGB BLD-MCNC: 14.5 G/DL
HGB UR QL STRIP: NEGATIVE
ISTAT IONIZED CALCIUM FOR CHEM 8: 1.17 MMOL/L (ref 1.12–1.32)
KETONES UR STRIP-MCNC: NEGATIVE MG/DL
LEUKOCYTE ESTERASE UR QL STRIP: NEGATIVE
LYMPHOCYTES # BLD AUTO: 2.7 X10ˆ3/UL (ref 1–4)
LYMPHOCYTES NFR BLD AUTO: 29.9 %
MCH RBC QN AUTO: 31.9 PG (ref 26–34)
MCHC RBC AUTO-ENTMCNC: 33.7 G/DL (ref 31–37)
MCV RBC AUTO: 94.7 FL (ref 80–100)
MIXED CELL %: 6.7 %
NEUTROPHILS # BLD AUTO: 5.6 X10ˆ3/UL (ref 1.5–7.7)
NEUTROPHILS NFR BLD AUTO: 63.4 %
NITRITE UR QL STRIP: NEGATIVE
PH UR STRIP: 6 [PH]
PLATELET # BLD AUTO: 278 X10ˆ3/UL (ref 150–450)
POTASSIUM BLD-SCNC: 4.3 MMOL/L (ref 3.6–5.1)
PROT UR STRIP-MCNC: NEGATIVE MG/DL
RBC # BLD AUTO: 4.54 X10ˆ6/UL
SODIUM BLD-SCNC: 139 MMOL/L (ref 136–145)
SP GR UR STRIP: 1.02
UROBILINOGEN UR STRIP-ACNC: <2 MG/DL
WBC # BLD AUTO: 8.9 X10ˆ3/UL (ref 4–11)

## 2024-11-01 PROCEDURE — 99214 OFFICE O/P EST MOD 30 MIN: CPT

## 2024-11-01 PROCEDURE — 80047 BASIC METABLC PNL IONIZED CA: CPT

## 2024-11-01 PROCEDURE — 99215 OFFICE O/P EST HI 40 MIN: CPT

## 2024-11-01 PROCEDURE — S0119 ONDANSETRON 4 MG: HCPCS

## 2024-11-01 PROCEDURE — 36415 COLL VENOUS BLD VENIPUNCTURE: CPT

## 2024-11-01 PROCEDURE — 85025 COMPLETE CBC W/AUTO DIFF WBC: CPT | Performed by: NURSE PRACTITIONER

## 2024-11-01 PROCEDURE — 74176 CT ABD & PELVIS W/O CONTRAST: CPT | Performed by: NURSE PRACTITIONER

## 2024-11-01 PROCEDURE — 81002 URINALYSIS NONAUTO W/O SCOPE: CPT

## 2024-11-01 RX ORDER — CYCLOBENZAPRINE HCL 10 MG
10 TABLET ORAL 3 TIMES DAILY PRN
Qty: 10 TABLET | Refills: 0 | Status: SHIPPED | OUTPATIENT
Start: 2024-11-01 | End: 2024-11-08

## 2024-11-01 RX ORDER — LIDOCAINE 50 MG/G
1 PATCH TOPICAL EVERY 24 HOURS
Qty: 7 PATCH | Refills: 0 | Status: SHIPPED | OUTPATIENT
Start: 2024-11-01

## 2024-11-01 NOTE — DISCHARGE INSTRUCTIONS
Your workup today looks good.  Continue Tylenol as needed for pain.  Change the pain patch daily.  Take Flexeril as needed for pain, this may make you sleepy.  Do not take prior to driving or drinking alcohol.  Apply heat.  Gentle stretching.  Consider non medicine treatment including massage, acupuncture, cupping, TENS stimulation.  See your primary doctor next week as scheduled.  Go to the emergency room if worsening symptoms, shortness of breath, uncontrolled pain

## 2024-11-01 NOTE — ED PROVIDER NOTES
Patient Seen in: [facility] [place]      History   No chief complaint on file.    Stated Complaint: Lower Side Pain  Subjective:   64-year-old female with arthritis, high cholesterol, bipolar presents from home.  Patient is here with complaint of right flank pain intermittent for the last 5 days.  States it feels like a stabbing pain.  Pain increases when she is moving and when she takes deep breaths in.  No radiation to her legs.  No associated injury.  No rash.  No dysuria or hematuria.  No fever.  She has been taking Tylenol for pain.  She does note that she has had a chronic cough for the last 3 months after COVID.  Possible history of COPD?  States someone had mentioned it many years ago.  She does use inhalers at home.  Chronic wheezing, using her rescue inhaler per norm.  No history of renal stone.  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  She is an active smoker, trying to quit, she is down to 2 cigarettes a day currently.  She does have tardive dyskinesia and she is ambulating with a walker per norm    The history is provided by the patient. No  was used.     Objective:   History reviewed. No pertinent past medical history.         Past Surgical History:   Procedure Laterality Date    Kristine biopsy stereo nodule 1 site left (cpt=19081)  07/24/2024    x clip, calcs              No pertinent social history.          Review of Systems    Positive for stated complaint: No chief complaint on file.    Other systems are as noted in HPI.  Constitutional and vital signs reviewed.      All other systems reviewed and negative except as noted above.    Physical Exam     ED Triage Vitals [11/01/24 1151]   /64   Pulse 85   Resp 18   Temp 97.3 °F (36.3 °C)   Temp src Temporal   SpO2 96 %   O2 Device None (Room air)     Current:/68   Pulse 76   Temp 97.3 °F (36.3 °C) (Temporal)   Resp 18   SpO2 97%     Physical Exam  Vitals and  nursing note reviewed.   Constitutional:       General: She is not in acute distress.     Appearance: Normal appearance. She is not ill-appearing or toxic-appearing.   HENT:      Head: Normocephalic and atraumatic.      Nose: Nose normal.      Mouth/Throat:      Mouth: Mucous membranes are moist.      Pharynx: Oropharynx is clear.   Eyes:      Pupils: Pupils are equal, round, and reactive to light.   Cardiovascular:      Rate and Rhythm: Normal rate and regular rhythm.      Pulses: Normal pulses.   Pulmonary:      Effort: Pulmonary effort is normal. No respiratory distress.      Comments: Diffuse coarse wheezing throughout.  No respiratory distress.  No hypoxia.  Pulse ox 96% room air which is normal  Abdominal:      General: Abdomen is flat.      Palpations: Abdomen is soft.      Tenderness: There is no abdominal tenderness. There is right CVA tenderness. There is no guarding.      Comments: No rash   Musculoskeletal:         General: No signs of injury. Normal range of motion.      Cervical back: Normal range of motion and neck supple.   Skin:     General: Skin is warm and dry.      Capillary Refill: Capillary refill takes less than 2 seconds.      Findings: No rash.   Neurological:      General: No focal deficit present.      Mental Status: She is alert and oriented to person, place, and time.      GCS: GCS eye subscore is 4. GCS verbal subscore is 5. GCS motor subscore is 6.      Comments: Ambulates with walker per norm, history of tardive dyskinesia   Psychiatric:         Mood and Affect: Mood normal.         Behavior: Behavior normal.         Thought Content: Thought content normal.         Judgment: Judgment normal.         ED Course   Radiology:  CT ABDOMEN+PELVIS KIDNEYSTONE 2D RNDR(NO IV,NO ORAL)(CPT=74176)    Result Date: 11/1/2024  CONCLUSION: No CT evidence of acute abnormality within the abdomen/pelvis.  No CT evidence of renal/ureteral stone    Dictated by (CST): Martell Becerra MD on 11/01/2024 at  12:50 PM     Finalized by (CST): Martell Becerra MD on 11/01/2024 at 12:54 PM         Labs Reviewed   POCT ISTAT CHEM8 CARTRIDGE - Abnormal; Notable for the following components:       Result Value    ISTAT BUN 27 (*)     ISTAT Glucose 106 (*)     ISTAT Creatinine 1.50 (*)     eGFR-Cr 39 (*)     All other components within normal limits   POCT CBC   [facility] POCT URINALYSIS DIPSTICK     Recent Results (from the past 24 hours)   POCT CBC    Collection Time: 11/01/24 12:06 PM   Result Value Ref Range    WBC IC 8.9 4.0 - 11.0 x10ˆ3/uL    RBC IC 4.54 3.80 - 5.30 X10ˆ6/uL    HGB IC 14.5 12.0 - 16.0 g/dL    HCT IC 43.0 35.0 - 48.0 %    MCV IC 94.7 80.0 - 100.0 fL    MCH IC 31.9 26.0 - 34.0 pg    MCHC IC 33.7 31.0 - 37.0 g/dL    PLT .0 150.0 - 450.0 X10ˆ3/uL    # Neutrophil 5.6 1.5 - 7.7 X10ˆ3/uL    # Lymphocyte 2.7 1.0 - 4.0 X10ˆ3/uL    # Mixed Cells 0.6 0.1 - 1.0 X10ˆ3/uL    Neutrophil % 63.4 %    Lymphocyte % 29.9 %    Mixed Cell % 6.7 %   POCT ISTAT chem8 cartridge    Collection Time: 11/01/24 12:21 PM   Result Value Ref Range    ISTAT Sodium 139 136 - 145 mmol/L    ISTAT BUN 27 (H) 7 - 18 mg/dL    ISTAT Potassium 4.3 3.6 - 5.1 mmol/L    ISTAT Chloride 106 98 - 112 mmol/L    ISTAT Ionized Calcium 1.17 1.12 - 1.32 mmol/L    ISTAT Hematocrit 44 34 - 50 %    ISTAT Glucose 106 (H) 70 - 99 mg/dL    ISTAT TCO2 22 21 - 32 mmol/L    ISTAT Creatinine 1.50 (H) 0.55 - 1.02 mg/dL    eGFR-Cr 39 (L) >=60 mL/min/1.73m2   POCT Urinalysis Dipstick    Collection Time: 11/01/24 12:21 PM   Result Value Ref Range    Urine Color Yellow Yellow    Urine Clarity Clear Clear    Specific Gravity, Urine 1.020 1.005 - 1.030    PH, Urine 6.0 5.0 - 8.0    Protein urine Negative Negative mg/dL    Glucose, Urine Negative Negative mg/dL    Ketone, Urine Negative Negative mg/dL    Bilirubin, Urine Negative Negative    Blood, Urine Negative Negative    Nitrite Urine Negative Negative    Urobilinogen urine <2.0 <2.0 mg/dL    Leukocyte esterase urine  Negative Negative       MDM     Medical Decision Making  Differential diagnoses reflecting the complexity of care include: UTI, renal failure, kidney stone, muscle pain  Patient with atraumatic right flank pain.  No associated urinary symptoms.  Pain with palpation, movement, inspiration  No rash to indicate shingles  UA unremarkable.  No evidence of infection.  No blood.  Chemistry showing chronic kidney disease.  Creatinine 1.5, GFR 39, consistent with previous, creatinine 1.33, GFR 45 in April  No leukocytosis on CBC  CT renal obtained.  Negative for stone.  Degenerative thoracic changes and fatty liver discussed with the patient.  No acute findings to attribute to patient's pain.  Symptoms appear muscular in nature, she is tender at this site.  Medicated with lidocaine patch here.  Patient does have chronic cough and shortness of breath.  Longstanding smoking history.  Possible COPD?  States she was told at one time she has it.  She does have rescue inhalers at home.  Denies acute symptoms at this time.  She does have diffuse wheezing throughout but no respiratory distress or hypoxia, pulse ox is 97% room air which is normal    Plan of Care: Continue Tylenol.  Add Flexeril and lidocaine.  Discussed nonpharmacologic measures such as acupuncture, massage, TENS stimulation.  She does have follow-up with her primary doctor next week.  She was encouraged to go to the emergency room if pain was uncontrolled or if she developed shortness of breath, new symptoms  The case was discussed with attending Dr Tripp. They are in agreement with the plan of care.     Results and plan of care discussed with the patient/family. They are in agreement with discharge. They understand to follow up with their primary doctor or the referral physician for further evaluation, especially if no improvement.  Also discussed the limitations of immediate care, patient is aware that if symptoms are worse they should go to the emergency room.  Verbal and written discharge instructions were given.     My independent interpretation of studies of: No renal stone on CT  Diagnostic tests and medications considered but not ordered were: Chest x-ray for chronic cough deferred  Shared decision making was done by: Discussed the option of going to the emergency room for further pain control but patient declined  Comorbidities that add complexity to management include: Smoking, bipolar, arthritis, high cholesterol  External chart review was done and was noted: Reviewed, was scheduled to see primary doctor today.  Last kidney function 4/10/2024 creatinine 1.33 GFR 45  History obtained by an independent source was from: N/A  Discussions and management was done with: N/A  Social determinants of health that affect care: Mental illness          Problems Addressed:  Back pain without radiation: acute illness or injury    Amount and/or Complexity of Data Reviewed  Labs: ordered. Decision-making details documented in ED Course.  Radiology: ordered and independent interpretation performed. Decision-making details documented in ED Course.    Risk  OTC drugs.  Prescription drug management.        Disposition and Plan     Clinical Impression:  1. Back pain without radiation         Disposition:  Discharge  11/1/2024  1:08 pm    Follow-up:  Fam Cerna MD  [address] [place] [address]  [phone]    Call today            Medications Prescribed:  Discharge Medication List as of 11/1/2024  1:08 PM        START taking these medications    Details   lidocaine 5 % External Patch Place 1 patch onto the skin daily., Normal, Disp-7 patch, R-0      cyclobenzaprine 10 MG Oral Tab Take 1 tablet (10 mg total) by mouth 3 (three) times daily as needed for Muscle spasms., Normal, Disp-10 tablet, R-0

## 2024-11-01 NOTE — ED INITIAL ASSESSMENT (HPI)
Patient reports 5 day hx of right lower back/flank pain.  States pain is stabbing in nature and worsens with movement.  Denies injury/trauma.  Denies dysuria, hematuria.

## 2024-11-06 ENCOUNTER — OFFICE VISIT (OUTPATIENT)
Dept: INTERNAL MEDICINE CLINIC | Facility: CLINIC | Age: 64
End: 2024-11-06
Payer: MEDICARE

## 2024-11-06 VITALS
HEART RATE: 78 BPM | SYSTOLIC BLOOD PRESSURE: 103 MMHG | BODY MASS INDEX: 37.17 KG/M2 | DIASTOLIC BLOOD PRESSURE: 69 MMHG | WEIGHT: 202 LBS | HEIGHT: 62 IN

## 2024-11-06 DIAGNOSIS — N18.30 STAGE 3 CHRONIC KIDNEY DISEASE, UNSPECIFIED WHETHER STAGE 3A OR 3B CKD (HCC): Primary | ICD-10-CM

## 2024-11-06 DIAGNOSIS — E78.00 PURE HYPERCHOLESTEROLEMIA: ICD-10-CM

## 2024-11-06 DIAGNOSIS — F17.200 TOBACCO DEPENDENCE: ICD-10-CM

## 2024-11-06 DIAGNOSIS — E04.9 GOITER: ICD-10-CM

## 2024-11-06 DIAGNOSIS — G43.809 OTHER MIGRAINE WITHOUT STATUS MIGRAINOSUS, NOT INTRACTABLE: ICD-10-CM

## 2024-11-06 DIAGNOSIS — R94.8 ABNORMAL POSITRON EMISSION TOMOGRAPHY (PET) SCAN: ICD-10-CM

## 2024-11-06 DIAGNOSIS — R92.8 ABNORMAL MAMMOGRAM OF LEFT BREAST: ICD-10-CM

## 2024-11-06 DIAGNOSIS — J43.8 OTHER EMPHYSEMA (HCC): ICD-10-CM

## 2024-11-06 DIAGNOSIS — Z71.6 ENCOUNTER FOR TOBACCO USE CESSATION COUNSELING: ICD-10-CM

## 2024-11-06 DIAGNOSIS — E03.9 HYPOTHYROIDISM, UNSPECIFIED TYPE: ICD-10-CM

## 2024-11-06 DIAGNOSIS — R91.1 LUNG NODULE: ICD-10-CM

## 2024-11-06 PROBLEM — G43.909 MIGRAINE: Status: ACTIVE | Noted: 2024-11-06

## 2024-11-06 PROCEDURE — 99215 OFFICE O/P EST HI 40 MIN: CPT | Performed by: INTERNAL MEDICINE

## 2024-11-06 PROCEDURE — G2211 COMPLEX E/M VISIT ADD ON: HCPCS | Performed by: INTERNAL MEDICINE

## 2024-11-06 RX ORDER — SUMATRIPTAN 50 MG/1
TABLET, FILM COATED ORAL
Qty: 9 TABLET | Refills: 1 | Status: SHIPPED | OUTPATIENT
Start: 2024-11-06

## 2024-11-06 NOTE — PROGRESS NOTES
Subjective:   Patient ID: Elin Walker is a 64 year old female.  Chief Complaint   Patient presents with    Test Results    Arthritis    Back Pain         Patient states she has chronic lower back pain neck pain and seen Dr Kirk  - MRI   c spine  and lumbar spine -was ordered and not  completed   Also chronic pain in the right knee and left knee she walks with a walker and she is dependent on that the due to her chronic pains.  She is seen many orthopedic doctors in the past   Patient states she has a bipolar 1 disorder she had severe ede -is under care of of the psychiatrist patient feels well right now.    Patient states she has chronic migraine headaches  Also has chronic cough she is using inhalers .she smokes .  Denies any acute cough fever chills   Denies chest pain, shortnesss of breath, palpitations, or abdominal pain,  fever or chills.   Hyperlipidemia  This is a chronic problem. The current episode started more than 1 year ago. Pertinent negatives include no chest pain or shortness of breath. Current antihyperlipidemic treatment includes statins and diet change. The current treatment provides significant improvement of lipids.       History/Other:   Review of Systems   Constitutional:  Negative for chills, fatigue and fever.   HENT:  Negative for ear pain and sore throat.          Left  ear  deaf   right  ear   has  hearing decreased  40 % - use  hearing  aid    Eyes:  Negative for pain and redness.   Respiratory: Chronic cough, no shortness of breath and sometimes wheezing.    Cardiovascular:  Negative for chest pain, palpitations and leg swelling.   Gastrointestinal:  Negative for abdominal pain, constipation, diarrhea, nausea and vomiting.   Genitourinary:  Negative for dysuria and frequency.   Musculoskeletal:  Positive for arthralgias (bill  knee pains) and back pain (lower back pain  severe   arthritis  - use  walker   5  year).   Skin:  Negative for pallor.   Neurological:  Negative for  dizziness and has chronic migraine  headaches.   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   The patient is not nervous/anxious.      Current Outpatient Medications   Medication Sig Dispense Refill    lidocaine 5 % External Patch Place 1 patch onto the skin daily. 7 patch 0    cyclobenzaprine 10 MG Oral Tab Take 1 tablet (10 mg total) by mouth 3 (three) times daily as needed for Muscle spasms. 10 tablet 0    levothyroxine 150 MCG Oral Tab Take 1 tablet (150 mcg total) by mouth before breakfast. DISCONTINUE LEVOTHYROXINE 200 MCG DAILY 90 tablet 3    diazePAM 10 MG Oral Tab Take 1 tablet (10 mg total) by mouth daily as needed.      Elastic Bandages & Supports (POSTURE SUPPORT/ELASTIC XL) Does not apply Misc Wear during the day as tolerates 1 each 0    albuterol 108 (90 Base) MCG/ACT Inhalation Aero Soln Inhale 2 puffs into the lungs every 4 (four) hours as needed for Wheezing or Shortness of Breath. 18 g 5    Albuterol Sulfate  (90 Base) MCG/ACT Inhalation Aero Soln Inhale 2 puffs into the lungs every 4 to 6 hours as needed.      SYMBICORT 160-4.5 MCG/ACT Inhalation Aerosol Inhale 2 puffs into the lungs 2 (two) times daily.      D3-50 1.25 MG (61131 UT) Oral Cap TAKE ONE CAPSULE BY MOUTH ONCE A WEEK WITH MEALS      FLUoxetine 20 MG Oral Cap Take 1 capsule (20 mg total) by mouth 2 (two) times daily.      zolpidem 10 MG Oral Tab Take 2 tablets (20 mg total) by mouth every evening.      butalbital-aspirin-caffeine -40 MG Oral Cap Take 1 capsule by mouth every 4 (four) hours as needed for Pain. (Patient not taking: Reported on 11/6/2024)      traZODone 150 MG Oral Tab Take 2 tablets (300 mg total) by mouth every evening. (Patient not taking: Reported on 11/6/2024)      benzonatate 200 MG Oral  Cap Take 1 capsule (200 mg total) by mouth 3 (three) times daily as needed for cough. (Patient not taking: Reported on 11/6/2024) 30 capsule 0    atorvastatin 40 MG Oral Tab Take 1 tablet (40 mg total) by mouth daily. (Patient not taking: Reported on 11/6/2024)       Allergies:  Allergies   Allergen Reactions    Penicillin G OTHER (SEE COMMENTS)     itchiness       Objective:   Physical Exam  Vitals and nursing note reviewed.   Constitutional:       General: She is not in acute distress.     Appearance: She is well-developed.        HENT:      Head: Normocephalic and atraumatic.        General: No scleral icterus.        Right eye: No discharge.         Left eye: No discharge.   Neck:      Thyroid: thyromegaly?     Vascular: No JVD.   Cardiovascular:      Rate and Rhythm: Normal rate and regular rhythm.      Heart sounds: Normal heart sounds. No murmur heard.  Pulmonary:      Effort: Pulmonary effort is normal.  Few wheezes on the lower bill back ,no respiratory distress.      Breath sounds: Normal breath sounds. No wheezing or rales.   Abdominal:      Palpations: Abdomen is soft. There is no mass.      Tenderness: There is no abdominal tenderness. There is no right CVA tenderness or left CVA tenderness.   Musculoskeletal:      Cervical back: Neck supple.   Mid back -lower back -  no  bony tenderness  scoliosis      Right lower leg: No edema.      Left lower leg: No edema.   Lymphadenopathy:      Cervical: No cervical adenopathy.   Skin:     General: Skin is warm and dry.   Neurological:      Mental Status: She is alert and oriented to person, place, and time.   Psychiatric:         Mood and Affect: Mood is not anxious or depressed.         Speech: Speech normal.         Behavior: Behavior normal.         Thought Content: Thought content does not include homicidal or suicidal ideation. Thought content does not include homicidal or suicidal plan.         Cognition and Memory: Cognition normal.      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    Assessment & Plan:     1. Pure hypercholesterolemia  Keep low saturated fat  diet   Avoid red meat and fast, fried food  Take fruits and vegetables   Keep active /walking ,exercise as  tolerated  Reach healthy weight   Continue present management    Atorvastatin 40 mg daily -   take it  regularly   Labs discussed with patient cholesterol is still elevated  Patient to keep   Improving low saturated fat  diet we will recheck in 3 to 4 months  Labs to  recheck    - CBC With Differential With Platelet; Future  - Comp Metabolic Panel (14); Future  - Lipid Panel; Future    2. Bipolar 1 disorder (HCC)   patient seeing psychiatrist and therapist regularly every 2 to 4 weeks  Continue present management   Stable  Dr Marines [place] office -just had visit this morning  Stable continue present management      Labs  3. Vitamin B12 deficiency  Take  every  other  day   Labs discussed with pt        4. Vitamin D deficiency  Vitamin D levels to be checked  Labs  Vit D3  500 mg   daily          Chronic low back pain without sciatica, unspecified back pain laterality  Bill hip pains    Bilateral knee pains  Chronic patient is using the walker for 5 years for chronic low back pains and hips and  knee pains  Arthritis  Seen  physical therapy Dr. Dr. Kirk for further evaluation and treatment possible physical therapy  - Physiatry Referral -  MRI  c  spine and lumbar spine ordered - pt  did not complete it yet   F/u physiatry      Chronic pain of right knee >L  Worse than left  Walks with walker  Physical therapy arthritis bilateral knees uses walker to walk   Was Referred to Dr. Kirk  Tylenol 500 mg tid - as needed     Migraine without aura and without status migrainosus, not intractable chronic migraine headaches  Patient seen  neurologist in past referred to see neurology  For further evaluation treatment   - Neuro Referral - In Network  Sumatriptan  50 mg as needed   Side effects and directions of medication discussed with patient. Patient verbalized understanding and compliance.        12. Chronic cough  Lung nodule - need  surveillance   smoking     No acute cough   On Symbicort bid   CPM   Patient to continue present inhalers  Chest x-ray - normal    Stable cpm    Refer  to Pulmo Dr Pepper - for copd and the pulmonary nodule f/u   Patient agreed and  verbalised understending     Pet scan  discussed  with pt  from   6/223    Ct chest ordered  no contrast due to  CKD -   gfr 37     IMPRESSION:   1. Recently described right middle lobe 1 cm lesion demonstrates no   pathologic uptake above the blood pool.  Additionally, there may be   suggestion of microscopic fat.  Therefore although this lesion could be   benign, continued surveillance of this nodule is suggested to exclude   hypometabolic neoplasm given smoking history.   2.  Multiple foci of hazy soft tissue stranding involving the left upper   back, right chest and abdominal wall, lateral thighs, and left gluteal   region likely posttraumatic in nature.  Correlate for any signs/symptoms of   superficial cellulitis.  No organized hematoma.   3.  Slight asymmetrically decreased uptake in the right thyroid as compared   to the left which is nonspecific and could be physiologic.  Dedicated   thyroid ultrasound suggested.   4.  Additional findings as detailed above.         Chronic kidney disease stage  3   Previous Labs reviewed   Avoid NSAIDs   keep good water hydration 50 to 60 ounces of water per day  Refer  to Nephrology Dr Baird   Labs to complete    Ct  results  disussed with pt        3. There are moderate atherosclerotic calcifications within a nonaneurysmal abdominal aorta and iliac arterial system.     4. There are moderate degenerative changes within the lower thoracic spine.   There is no acute or destructive bony process.     The remainder of the examination is unremarkable.  Specifically, the visualized aspects of the spleen, pancreas, adrenals, kidneys/ureters/urinary bladder, stomach, small bowel, colon, and remaining soft tissues demonstrated grossly normal CT appearance  for a patient of this age.  There is no free fluid or organized fluid collection.  There is no significant adenopathy.                       Impression   CONCLUSION: No CT evidence of acute abnormality within the abdomen/pelvis.  No CT evidence of renal/ureteral stone       Abn mammogram  left breast   Hx  bx left breast - benign   Recommeded Mammogram dg and us  in  6 months   2/25 pt  education     Goiter   Abnormal  pet scan   Us  thyroid     Written instructions provided to patient patient agrees with plan verbalized understanding and compliance    No orders of the defined types were placed in this encounter.        Meds This Visit:  Requested Prescriptions      No prescriptions requested or ordered in this encounter       Imaging & Referrals:  NEPHROLOGY - INTERNAL  PULMONARY - INTERNAL

## 2024-11-08 ENCOUNTER — TELEPHONE (OUTPATIENT)
Dept: INTERNAL MEDICINE CLINIC | Facility: CLINIC | Age: 64
End: 2024-11-08

## 2024-11-08 NOTE — TELEPHONE ENCOUNTER
I left office   What are the concerns with sumatriptan?  I recommended patient to schedule appointment with  to see Neurologist for migraine headache .

## 2024-11-08 NOTE — TELEPHONE ENCOUNTER
Spoke to patient. She would like to speak directly to Dr. Cerna regarding concerns she has taking Sumatriptan. RN relayed her last appointment was at 11:20 AM and may have left for the day.     Dr. Cerna, please call patient when you're able.

## 2024-11-09 NOTE — TELEPHONE ENCOUNTER
Called patient ,no answer , left messages   patient to if any symptoms or concerns she would leave the message with the our Nurses first .      Again recommended patient to schedule apt to Neurologist for her migraine headaches .

## 2024-11-11 NOTE — TELEPHONE ENCOUNTER
Patient returned call.  States she is still experiencing stabbing pain in her side.  She is apprehensive about taking sumatriptan because she knows someone who took it and had a stroke.  She scheduled with neurologist but not until the end of November.  She would like to discuss ongoing symptoms with Dr. Cerna.

## 2024-11-12 NOTE — TELEPHONE ENCOUNTER
Noted  ,  pt schedule apt  with Neurologist ? Do not see patient scheduled apt ?    Pt has  migraine and  need  medications that  will  work for migraine .  Sumatriptan is Drug of choice -stroke is not SE of medications  , but looks like patient does not want to use it.    Can try Tylenol 500 mg  -2 tab as needed  Due to other sedating  medications patient is taking I do not feel comfortable giving her other pain medications . -Patient  to see Neurologist .

## 2024-11-13 NOTE — TELEPHONE ENCOUNTER
Spoke to patient she states that she heard that her friend had a stroke from the sumatriptan spoke to patient regarding all the side effects but stroke is not side effect ,Patient denies any history of heart disease.  Recommend to possibly try half of the tablet with migraine headache that does not go away with Tylenol.  Patient agrees with plan she will see neurologist as advised for-Headaches.  Patient states she also feels in a pain  Her back lower back that hurts her all the time and the pain is also on the side   Recommend patient to take Tylenol 500 mg 3 times daily as needed    Patient state has appointment with rheumatologist  and MRI  umbar cervical spine f/u with physical therapy Dr  She has MRI scheduled of the cervical spine and lumbar spine follow-up with physical therapy Dr.   If the pain is very severe patient to go to emergency room    Patient agrees with plan and verbalized understanding compliance

## 2024-11-18 ENCOUNTER — OFFICE VISIT (OUTPATIENT)
Dept: DERMATOLOGY CLINIC | Facility: CLINIC | Age: 64
End: 2024-11-18

## 2024-11-18 DIAGNOSIS — L82.1 SEBORRHEIC KERATOSES: Primary | ICD-10-CM

## 2024-11-18 DIAGNOSIS — L57.0 ACTINIC KERATOSIS: ICD-10-CM

## 2024-11-18 DIAGNOSIS — L91.8 INFLAMED ACROCHORDON: ICD-10-CM

## 2024-11-18 DIAGNOSIS — D22.9 MULTIPLE BENIGN NEVI: ICD-10-CM

## 2024-11-18 DIAGNOSIS — D18.01 CHERRY ANGIOMA: ICD-10-CM

## 2024-11-18 DIAGNOSIS — D49.2 NEOPLASM OF UNSPECIFIED BEHAVIOR OF BONE, SOFT TISSUE, AND SKIN: ICD-10-CM

## 2024-11-18 DIAGNOSIS — L81.4 LENTIGINES: ICD-10-CM

## 2024-11-18 PROCEDURE — 11200 RMVL SKIN TAGS UP TO&INC 15: CPT | Performed by: STUDENT IN AN ORGANIZED HEALTH CARE EDUCATION/TRAINING PROGRAM

## 2024-11-18 PROCEDURE — 99204 OFFICE O/P NEW MOD 45 MIN: CPT | Performed by: STUDENT IN AN ORGANIZED HEALTH CARE EDUCATION/TRAINING PROGRAM

## 2024-11-18 PROCEDURE — 88305 TISSUE EXAM BY PATHOLOGIST: CPT | Performed by: STUDENT IN AN ORGANIZED HEALTH CARE EDUCATION/TRAINING PROGRAM

## 2024-11-18 PROCEDURE — 17000 DESTRUCT PREMALG LESION: CPT | Performed by: STUDENT IN AN ORGANIZED HEALTH CARE EDUCATION/TRAINING PROGRAM

## 2024-11-18 PROCEDURE — 11102 TANGNTL BX SKIN SINGLE LES: CPT | Performed by: STUDENT IN AN ORGANIZED HEALTH CARE EDUCATION/TRAINING PROGRAM

## 2024-11-18 RX ORDER — BENZTROPINE MESYLATE 1 MG/1
1 TABLET ORAL 2 TIMES DAILY
COMMUNITY
Start: 2024-10-20

## 2024-11-18 RX ORDER — LEVOTHYROXINE SODIUM 200 UG/1
1 TABLET ORAL DAILY
COMMUNITY
Start: 2024-01-08

## 2024-11-18 RX ORDER — LEVOTHYROXINE SODIUM 200 UG/1
200 CAPSULE ORAL DAILY
COMMUNITY
Start: 2022-06-28

## 2024-11-18 RX ORDER — GABAPENTIN 300 MG/1
300 CAPSULE ORAL 3 TIMES DAILY
COMMUNITY
Start: 2024-07-15

## 2024-11-18 RX ORDER — OXCARBAZEPINE 150 MG/1
300 TABLET, FILM COATED ORAL 2 TIMES DAILY
COMMUNITY
Start: 2024-09-17

## 2024-11-18 RX ORDER — ATORVASTATIN CALCIUM 20 MG/1
20 TABLET, FILM COATED ORAL DAILY
COMMUNITY
Start: 2024-10-23

## 2024-11-18 NOTE — PROGRESS NOTES
November 18, 2024    New patient     Referred by:   Fam Cerna MD  [address] [place] [address]     CHIEF COMPLAINT: UBSE    HISTORY OF PRESENT ILLNESS: .    1. Lesion  Location: back   Duration: over 1 year  Signs and symptoms: itchy  Current treatment: None  Past treatments: None    2. Lesion  Location: face   Duration: over 1 year  Signs and symptoms: itchy  Current treatment: None  Past treatments: None    3. Lesion  Location: neck    Duration: over 1 year  Signs and symptoms: itchy   Current treatment: none  Past treatments: None      DERM HISTORY:  History of skin cancer: Yes-patient reported on the ear     FAMILY HISTORY:  History of melanoma: No    PAST MEDICAL HISTORY:  History reviewed. No pertinent past medical history.    REVIEW OF SYSTEMS:  Constitutional: Denies fever, chills, unintentional weight loss.   Skin as per HPI    Medications  Current Outpatient Medications   Medication Sig Dispense Refill    benztropine 1 MG Oral Tab Take 1 tablet (1 mg total) by mouth 2 (two) times daily.      gabapentin 300 MG Oral Cap Take 1 capsule (300 mg total) by mouth 3 (three) times daily.      OXcarbazepine 150 MG Oral Tab Take 2 tablets (300 mg total) by mouth 2 (two) times daily.      atorvastatin 20 MG Oral Tab Take 1 tablet (20 mg total) by mouth daily.      levothyroxine 200 MCG Oral Tab Take 1 tablet (200 mcg total) by mouth daily.      Levothyroxine Sodium 200 MCG Oral Cap Take 200 mcg by mouth daily.      SUMAtriptan 50 MG Oral Tab Take  1 tablet orally  with migraine  headache   as needed, but no more than  2  tablets per week 9 tablet 1    lidocaine 5 % External Patch Place 1 patch onto the skin daily. 7 patch 0    levothyroxine 150 MCG Oral Tab Take 1 tablet (150 mcg total) by mouth before breakfast. DISCONTINUE LEVOTHYROXINE 200 MCG DAILY 90 tablet 3    butalbital-aspirin-caffeine -40 MG Oral Cap Take 1 capsule by mouth every 4 (four) hours as needed for Pain. (Patient not taking:  Reported on 11/18/2024)      diazePAM 10 MG Oral Tab Take 1 tablet (10 mg total) by mouth daily as needed.      traZODone 150 MG Oral Tab Take 2 tablets (300 mg total) by mouth every evening. (Patient not taking: Reported on 11/18/2024)      Elastic Bandages & Supports (POSTURE SUPPORT/ELASTIC XL) Does not apply Misc Wear during the day as tolerates 1 each 0    albuterol 108 (90 Base) MCG/ACT Inhalation Aero Soln Inhale 2 puffs into the lungs every 4 (four) hours as needed for Wheezing or Shortness of Breath. 18 g 5    benzonatate 200 MG Oral Cap Take 1 capsule (200 mg total) by mouth 3 (three) times daily as needed for cough. (Patient not taking: Reported on 11/18/2024) 30 capsule 0    Albuterol Sulfate  (90 Base) MCG/ACT Inhalation Aero Soln Inhale 2 puffs into the lungs every 4 to 6 hours as needed.      atorvastatin 40 MG Oral Tab Take 1 tablet (40 mg total) by mouth daily. (Patient not taking: Reported on 11/18/2024)      SYMBICORT 160-4.5 MCG/ACT Inhalation Aerosol Inhale 2 puffs into the lungs 2 (two) times daily.      D3-50 1.25 MG (07359 UT) Oral Cap TAKE ONE CAPSULE BY MOUTH ONCE A WEEK WITH MEALS      FLUoxetine 20 MG Oral Cap Take 1 capsule (20 mg total) by mouth 2 (two) times daily.      zolpidem 10 MG Oral Tab Take 2 tablets (20 mg total) by mouth every evening.         PHYSICAL EXAM:  Patient declined chaperone   General: awake, alert, no acute distress  Skin: Skin exam was performed today including the following: head and face, scalp, neck, chest (including breasts and axillae), abdomen, back, bilateral upper extremities, bilateral lower extremities, hands, feet, digits, nails. Pertinent findings include:   - Scattered bright red-purple dome-shaped papules on the trunk and extremities   - Scattered light brown stellate macules on sun exposed sites  - Scattered, evenly colored, round brown macules and papules with regular borders on the trunk and extremities  - Numerous scattered skin-colored  and brown, waxy, stuck-on papules and plaques on the trunk and extremities      ASSESSMENT & PLAN:  Pathophysiology of diagnoses discussed with patient.  Therapeutic options reviewed. Risks, benefits, and alternatives discussed with patient. Instructions reviewed at length.    #Lentigines  #Seborrheic keratoses   #Cherry angiomas   - Reassurance provided regarding the benign nature of these lesions.    #Multiple benign nevi  - Complete skin exam performed today with no outlier lesions identified   - Reassured patient of benign nature of these lesions.   - Recommend daily photoprotection with broad-spectrum sunscreen, avoidance of sun during peak hours, and sun protective clothing.    - Dermoscopy was used for physical examination of pigmented lesions during today's office visit.    #Actinic Keratosis  - Discussed premalignant etiology and possibility of transformation to SCC  - Recommended cryotherapy today   - Discussed side effects including redness, swelling, crusting, and discolortion after treatment, wound care with soap/water and vaseline     - Procedure Note Cryosurgery of pre-malignant lesion(s)  Risks, benefits, alternatives, complications, and personnel required for cryosurgery reviewed with patient. Patient verbalizes understanding and wishes to proceed.   - Cryosurgery performed with Liquid Nitrogen via cryostat spray gun to Actinic Keratosis . 1 lesion(s) treated.   - Patient tolerated well and wound care discussed. Return if lesions fail to fully resolve.    #Neoplasm(s) of uncertain behavior of skin  - Shave biopsy performed today   - Will notify patient with results and arrange for appropriate definitive treatment, if indicated.      Shave of lesion to establish and confirm diagnosis:  Photo taken: Yes    Risks, benefits, alternatives and personnel required for shave biopsy reviewed with patient. Risks discussed include, but not limited to: pain, bleeding, infection, scar, reaction to anesthetic, and  recurrence/need for further treatment.  Patient and physician agree as to site(s) to be biopsied. Patient verbalizes understanding and wishes to proceed.     Site(s) prepped with alcohol and anesthetized with 1% lidocaine with epinephrine.   Shave of lesion(s) performed to the level of the dermis. Specimen(s) from A. L shoulder  sent for pathology to r/o ISK 50% ALCL and bandaging applied.   Written and verbal wound care instructions provided to patient, understanding verbalized.    #Acrochordons, inflamed   - Skin tags are in locations where friction from clothing, jewelry, shaving cause pain, frequent trauma, bleeding occur and patient requests removal.  - Using sterile scissors,  4 inflamed skin tags were snipped off at their bases after cleansing with alcochol. Hemostasis with aluminum chloride.  Bandaids placed. Would care discussed.     Return to clinic: 1 year  or sooner if something concerning arises     Eduardo Kee MD

## 2024-11-30 ENCOUNTER — NURSE TRIAGE (OUTPATIENT)
Dept: INTERNAL MEDICINE CLINIC | Facility: CLINIC | Age: 64
End: 2024-11-30

## 2024-11-30 ENCOUNTER — HOSPITAL ENCOUNTER (OUTPATIENT)
Dept: MRI IMAGING | Age: 64
Discharge: HOME | End: 2024-11-30
Attending: PHYSICAL MEDICINE & REHABILITATION
Payer: MEDICARE

## 2024-11-30 ENCOUNTER — HOSPITAL ENCOUNTER (OUTPATIENT)
Dept: CT IMAGING | Age: 64
Discharge: HOME | End: 2024-11-30
Attending: INTERNAL MEDICINE
Payer: MEDICARE

## 2024-11-30 ENCOUNTER — HOSPITAL ENCOUNTER (OUTPATIENT)
Dept: ULTRASOUND IMAGING | Age: 64
Discharge: HOME | End: 2024-11-30
Attending: INTERNAL MEDICINE
Payer: MEDICARE

## 2024-11-30 DIAGNOSIS — E04.9 GOITER: ICD-10-CM

## 2024-11-30 DIAGNOSIS — F17.200 TOBACCO DEPENDENCE: ICD-10-CM

## 2024-11-30 DIAGNOSIS — G89.4 CHRONIC PAIN SYNDROME: ICD-10-CM

## 2024-11-30 DIAGNOSIS — R91.1 LUNG NODULE: ICD-10-CM

## 2024-11-30 DIAGNOSIS — M47.816 LUMBAR SPONDYLOSIS: ICD-10-CM

## 2024-11-30 DIAGNOSIS — J43.8 OTHER EMPHYSEMA (HCC): ICD-10-CM

## 2024-11-30 DIAGNOSIS — R94.8 ABNORMAL POSITRON EMISSION TOMOGRAPHY (PET) SCAN: ICD-10-CM

## 2024-11-30 DIAGNOSIS — M47.812 CERVICAL SPONDYLOSIS: ICD-10-CM

## 2024-11-30 PROCEDURE — 71250 CT THORAX DX C-: CPT | Performed by: INTERNAL MEDICINE

## 2024-11-30 PROCEDURE — 72141 MRI NECK SPINE W/O DYE: CPT | Performed by: PHYSICAL MEDICINE & REHABILITATION

## 2024-11-30 PROCEDURE — 76536 US EXAM OF HEAD AND NECK: CPT | Performed by: INTERNAL MEDICINE

## 2024-11-30 PROCEDURE — 72148 MRI LUMBAR SPINE W/O DYE: CPT | Performed by: PHYSICAL MEDICINE & REHABILITATION

## 2024-11-30 NOTE — TELEPHONE ENCOUNTER
Patient stopped in the office to ask if Dr. Cerna will approve an antibiotic that she previously prescribed. Patient did not know the name but states she isnt feeling well and the medicine helped last time. Please advise.

## 2024-12-03 ENCOUNTER — TELEMEDICINE (OUTPATIENT)
Dept: INTERNAL MEDICINE CLINIC | Facility: CLINIC | Age: 64
End: 2024-12-03

## 2024-12-03 DIAGNOSIS — J01.80 ACUTE NON-RECURRENT SINUSITIS OF OTHER SINUS: Primary | ICD-10-CM

## 2024-12-03 PROCEDURE — 99213 OFFICE O/P EST LOW 20 MIN: CPT | Performed by: INTERNAL MEDICINE

## 2024-12-03 RX ORDER — DOXYCYCLINE 100 MG/1
100 TABLET ORAL 2 TIMES DAILY
Qty: 20 TABLET | Refills: 0 | Status: SHIPPED | OUTPATIENT
Start: 2024-12-03 | End: 2024-12-13

## 2024-12-03 NOTE — PROGRESS NOTES
Telemedicine Note    Virtual Video Check-In    Elin Walker verbally consents to a Virtual Video Check-In service on 12/03/24. Patient understands and accepts financial responsibility for any deductible, co-insurance and/or co-pays associated with this service. This visit is conducted using Telemedicine with live, interactive video and audio.     I spoke with Elin Walker by secure video chat, verified date of birth, and discussed their current concerns:       HPI :  Patient reports that 4 days ago she developed cough, now cough since subsided but not completely gone, and all symptoms localized in the sinuses, feels congested, frequent cough, drains greenish secretion from the sinuses, she denies fever chills.  Does not feel that symptoms localized in the chest, history of asthma, does not take inhalers on a regular basis.      No past medical history on file.  Past Surgical History:   Procedure Laterality Date    Kristine biopsy stereo nodule 1 site left (cpt=19081)  07/24/2024    x clip, calcs     Family History   Problem Relation Age of Onset    Other (Other) Father         Renal Failure    Other (Other) Mother         Grave's Disease    Cancer Mother         lung    Cancer Maternal Grandmother         Stomach    Other (Other) Maternal Grandfather         Heart Failure    Ovarian Cancer Paternal Grandmother         AGE UNKNOWN    Cancer Paternal Grandmother     Skin cancer Sister     Cancer Brother         Lung cancer    Infectious Disease Other       Patient Active Problem List   Diagnosis    Primary insomnia    Vitamin D deficiency    Pure hypercholesterolemia    Stage 3 chronic kidney disease (HCC)    Other specified hypothyroidism    Other emphysema (HCC)    Polyarthralgia    Abnormal mammogram of left breast    Migraine     Current Outpatient Medications   Medication Sig Dispense Refill    Doxycycline Monohydrate 100 MG Oral Tab Take 100 mg by mouth in the morning and 100 mg before bedtime. Do all this  for 10 days. 20 tablet 0    benztropine 1 MG Oral Tab Take 1 tablet (1 mg total) by mouth 2 (two) times daily.      gabapentin 300 MG Oral Cap Take 1 capsule (300 mg total) by mouth 3 (three) times daily.      OXcarbazepine 150 MG Oral Tab Take 2 tablets (300 mg total) by mouth 2 (two) times daily.      atorvastatin 20 MG Oral Tab Take 1 tablet (20 mg total) by mouth daily.      levothyroxine 200 MCG Oral Tab Take 1 tablet (200 mcg total) by mouth daily.      Levothyroxine Sodium 200 MCG Oral Cap Take 200 mcg by mouth daily.      SUMAtriptan 50 MG Oral Tab Take  1 tablet orally  with migraine  headache   as needed, but no more than  2  tablets per week 9 tablet 1    lidocaine 5 % External Patch Place 1 patch onto the skin daily. 7 patch 0    levothyroxine 150 MCG Oral Tab Take 1 tablet (150 mcg total) by mouth before breakfast. DISCONTINUE LEVOTHYROXINE 200 MCG DAILY 90 tablet 3    butalbital-aspirin-caffeine -40 MG Oral Cap Take 1 capsule by mouth every 4 (four) hours as needed for Pain. (Patient not taking: Reported on 11/18/2024)      diazePAM 10 MG Oral Tab Take 1 tablet (10 mg total) by mouth daily as needed.      traZODone 150 MG Oral Tab Take 2 tablets (300 mg total) by mouth every evening. (Patient not taking: Reported on 11/18/2024)      Elastic Bandages & Supports (POSTURE SUPPORT/ELASTIC XL) Does not apply Misc Wear during the day as tolerates 1 each 0    albuterol 108 (90 Base) MCG/ACT Inhalation Aero Soln Inhale 2 puffs into the lungs every 4 (four) hours as needed for Wheezing or Shortness of Breath. 18 g 5    benzonatate 200 MG Oral Cap Take 1 capsule (200 mg total) by mouth 3 (three) times daily as needed for cough. (Patient not taking: Reported on 11/18/2024) 30 capsule 0    Albuterol Sulfate  (90 Base) MCG/ACT Inhalation Aero Soln Inhale 2 puffs into the lungs every 4 to 6 hours as needed.      atorvastatin 40 MG Oral Tab Take 1 tablet (40 mg total) by mouth daily. (Patient not taking:  Reported on 11/18/2024)      SYMBICORT 160-4.5 MCG/ACT Inhalation Aerosol Inhale 2 puffs into the lungs 2 (two) times daily.      D3-50 1.25 MG (32460 UT) Oral Cap TAKE ONE CAPSULE BY MOUTH ONCE A WEEK WITH MEALS      FLUoxetine 20 MG Oral Cap Take 1 capsule (20 mg total) by mouth 2 (two) times daily.      zolpidem 10 MG Oral Tab Take 2 tablets (20 mg total) by mouth every evening.       Allergies[1]    ROS   GENERAL: negative for fever or chills  HEENT: Positive, negative for for sinus pain or sore throat   LUNGS: negative for shortness of breath or cough ,no wheezing   CARDIOVASCULAR: negative for chest pain dyspnea on exertion or palpitations   GI: negative for abdominal pain,  nausea,vomiting, diarrhea or constipation  : negative for burning with urination, frequency with urination   MUSCULOSKELETAL: negative for body  aches   NEURO: negative for headache and dizziness  PSYCHE: negative for depression or anxiety symptoms    ALL/ASTHMA: negative for allergy symptoms     Physical Exam  Patient alert and oriented x3  Patient does not appear in any respiratory distress during the conversation  No labored breathing , frequently coughing, involuntary movements of the lips during conversation    Movement of upper extremities normal     Summary of topics discussed/ diagnosis:  Assessment and plan    Acute sinusitis nonrecurrent, will treat with doxycycline for 10 days, advised to start using inhaler if experiencing tightening in the chest to increase in frequency of the cough.      Follow Up with PCP or myself if not improved in 7 to 10 days        Explained to patient rationale of phone triage and evaluation due to current COVID-19 outbreak based upon CDC recommendations, as well as limitations of video triage including but not limited to the inability to perform appropriate physical exam nor face-to-face examination, which may limit and/or reduce diagnostic accuracy. Elin Walker was informed and agrees that  this telemedicine visit will charged. Elin Walker expresses understanding, accepts these limitations, and agrees to video evaluation as documented above. Elin Walker understands video evaluation is not a substitute for face-to-face examination or emergency care. Patient advised to go to ER or call 911 for worsening symptoms or acute distress.   Josefina Santiago MD           [1]   Allergies  Allergen Reactions    Dust Mite Extract UNKNOWN     Seasonal    Penicillin G OTHER (SEE COMMENTS)     itchiness

## 2024-12-03 NOTE — TELEPHONE ENCOUNTER
Action Requested: Summary for Provider     []  Critical Lab, Recommendations Needed  [] Need Additional Advice  [x]   FYI    []   Need Orders  [] Need Medications Sent to Pharmacy  []  Other     SUMMARY: Patient stated that she had a sinus infection and was prescribed an antibiotic and the symptoms resolved. Symptoms returned- having sinus pressure, headache, blowing out green mucus, and cough. No fevers. No shortness of breath or wheezing. No chest pain. No other symptoms. Wanted to get the same antibiotic prescribed. No appointments with Dr Cerna. 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 Video visit made for today at 11am with Dr Santiago. Went over instructions, copay and that provider will be calling from a restricted/unknown number to make sure able to accept/pickup those calls. Patient agreed.   Reason for call: Sinusitis (Sinus pressure, headache, green mucus, cough)  Onset: Data Unavailable    Reason for Disposition   Sinus congestion (pressure, fullness) present > 10 days    Protocols used: Sinus Pain and Congestion-A-[place]

## 2024-12-04 ENCOUNTER — TELEMEDICINE (OUTPATIENT)
Dept: PHYSICAL MEDICINE AND REHAB | Facility: CLINIC | Age: 64
End: 2024-12-04
Payer: MEDICARE

## 2024-12-04 DIAGNOSIS — M47.812 ARTHROPATHY OF CERVICAL FACET JOINT: Primary | ICD-10-CM

## 2024-12-04 DIAGNOSIS — M47.816 LUMBAR FACET ARTHROPATHY: ICD-10-CM

## 2024-12-04 PROCEDURE — 99214 OFFICE O/P EST MOD 30 MIN: CPT | Performed by: PHYSICAL MEDICINE & REHABILITATION

## 2024-12-04 NOTE — PATIENT INSTRUCTIONS
-Start PT and home exercises ([facility])  -Ice/Heat as tolerated  -Tylenol and Gabapentin as needed  -Follow up in 2 months  -If no better, will discuss injection

## 2024-12-16 ENCOUNTER — HOSPITAL ENCOUNTER (OUTPATIENT)
Age: 64
Discharge: HOME | End: 2024-12-16
Attending: EMERGENCY MEDICINE
Payer: MEDICARE

## 2024-12-16 ENCOUNTER — OFFICE VISIT (OUTPATIENT)
Facility: CLINIC | Age: 64
End: 2024-12-16

## 2024-12-16 VITALS
BODY MASS INDEX: 37 KG/M2 | HEART RATE: 76 BPM | SYSTOLIC BLOOD PRESSURE: 97 MMHG | WEIGHT: 205 LBS | DIASTOLIC BLOOD PRESSURE: 49 MMHG

## 2024-12-16 VITALS
RESPIRATION RATE: 18 BRPM | HEART RATE: 77 BPM | DIASTOLIC BLOOD PRESSURE: 65 MMHG | TEMPERATURE: 98 F | SYSTOLIC BLOOD PRESSURE: 106 MMHG | OXYGEN SATURATION: 96 %

## 2024-12-16 DIAGNOSIS — E78.00 PURE HYPERCHOLESTEROLEMIA: ICD-10-CM

## 2024-12-16 DIAGNOSIS — N25.81 SECONDARY HYPERPARATHYROIDISM OF RENAL ORIGIN (HCC): ICD-10-CM

## 2024-12-16 DIAGNOSIS — E03.9 HYPOTHYROIDISM, UNSPECIFIED TYPE: ICD-10-CM

## 2024-12-16 DIAGNOSIS — R59.0 ANTERIOR CERVICAL LYMPHADENOPATHY: Primary | ICD-10-CM

## 2024-12-16 DIAGNOSIS — N18.30 STAGE 3 CHRONIC KIDNEY DISEASE, UNSPECIFIED WHETHER STAGE 3A OR 3B CKD (HCC): Primary | ICD-10-CM

## 2024-12-16 DIAGNOSIS — M25.50 POLYARTHRALGIA: ICD-10-CM

## 2024-12-16 LAB — S PYO AG THROAT QL IA.RAPID: NEGATIVE

## 2024-12-16 PROCEDURE — 99213 OFFICE O/P EST LOW 20 MIN: CPT

## 2024-12-16 PROCEDURE — 99204 OFFICE O/P NEW MOD 45 MIN: CPT | Performed by: INTERNAL MEDICINE

## 2024-12-16 PROCEDURE — 87651 STREP A DNA AMP PROBE: CPT | Performed by: EMERGENCY MEDICINE

## 2024-12-16 PROCEDURE — 99212 OFFICE O/P EST SF 10 MIN: CPT

## 2024-12-16 NOTE — PROGRESS NOTES
Consult Requested By: Dr. Cerna    Reason for Consult: CKD     HPI:     63 y/o F with h/o HLD, hypothyroidism, bipolar, migraine, severe arthritis/ scoliosis,  and CKD 3 here for initial evaluation  Seen a  Nephrologist many yrs back and was told she has ckd.  No h/o HTN, DM, . Recently had sharp pain on right flank  Previously was on nsaids , however stopped  Was on lithium for 10 + yrs in past and was stopped bc of worsening cr and been told ckd bc of that.  No FH of esrd      Labs reviewed  Nov 2024 ct abd- unremarkable kidneys  eGFR done in nov shows to have dropped to 39 ml/min    Cr in April 2024 1.3 mg/dl, eGFR 45 ml/min  In 2023 cr 1.0-1.3 mg/dl eGFR 48 ml/min      HISTORY:  History reviewed. No pertinent past medical history.   Past Surgical History:   Procedure Laterality Date    Kristine biopsy stereo nodule 1 site left (cpt=19081)  07/24/2024    x clip, calcs      Family History   Problem Relation Age of Onset    Other (Other) Father         Renal Failure    Other (Other) Mother         Grave's Disease    Cancer Mother         lung    Cancer Maternal Grandmother         Stomach    Other (Other) Maternal Grandfather         Heart Failure    Ovarian Cancer Paternal Grandmother         AGE UNKNOWN    Cancer Paternal Grandmother     Skin cancer Sister     Cancer Brother         Lung cancer    Infectious Disease Other       Social History:   Social History     Socioeconomic History    Marital status: Single   Tobacco Use    Smoking status: Some Days     Types: Cigarettes    Tobacco comments:     On and off   Vaping Use    Vaping status: Never Used   Substance and Sexual Activity    Alcohol use: Never    Drug use: Never   Other Topics Concern    Grew up on a farm No    History of tanning Yes    Outdoor occupation No    Breast feeding No    Reaction to local anesthetic No    Pt has a pacemaker No    Pt has a defibrillator No     Social Drivers of Health     Financial Resource Strain: Low Risk  (5/7/2023)     Received from [facility], [facility]    Financial Resource Strain     In the past year, have you or any family members you live with been unable to get any of the following when it was really needed? Check all that apply.: None   Food Insecurity: Not At Risk (5/7/2023)    Received from [facility], [facility]    Food Insecurity     RETIRE How often in the past 12 months would you say you are worried or stressed about having enough money to buy nutritious meals? : Never   Transportation Needs: No Transportation Needs (5/7/2023)    Received from [facility], [facility], [facility], [facility]    PRAPARE - Transportation     In the past 12 months, has lack of transportation kept you from medical appointments or from getting medications?: No     In the past 12 months, has lack of transportation kept you from meetings, work, or from getting things needed for daily living?: No   Social Connections: Low Risk  (5/8/2023)    [facility] from [facility], [facility]    Social Connections     How often do you see or talk to people that you care about and feel close to? (For example: talking to friends on the phone, visiting friends or family, going to [employer] or club meetings): 5 or more times a week        Medications (Active prior to today's visit):  Current Outpatient Medications   Medication Sig Dispense Refill    benztropine 1 MG Oral Tab Take 1 tablet (1 mg total) by mouth 2 (two) times daily.      gabapentin 300 MG Oral Cap Take 1 capsule (300 mg total) by mouth 3 (three) times daily.      OXcarbazepine 150 MG Oral Tab Take 2 tablets (300 mg total) by mouth 2 (two) times daily.      atorvastatin 20 MG Oral Tab Take 1 tablet (20 mg total) by mouth daily.      SUMAtriptan 50 MG Oral Tab Take  1 tablet orally  with migraine  headache   as needed, but no more than  2  tablets per week 9 tablet 1    levothyroxine  150 MCG Oral Tab Take 1 tablet (150 mcg total) by mouth before breakfast. DISCONTINUE LEVOTHYROXINE 200 MCG DAILY 90 tablet 3    diazePAM 10 MG Oral Tab Take 1 tablet (10 mg total) by mouth daily as needed.      traZODone 150 MG Oral Tab Take 2 tablets (300 mg total) by mouth every evening.      albuterol 108 (90 Base) MCG/ACT Inhalation Aero Soln Inhale 2 puffs into the lungs every 4 (four) hours as needed for Wheezing or Shortness of Breath. 18 g 5    Albuterol Sulfate  (90 Base) MCG/ACT Inhalation Aero Soln Inhale 2 puffs into the lungs every 4 to 6 hours as needed.      SYMBICORT 160-4.5 MCG/ACT Inhalation Aerosol Inhale 2 puffs into the lungs 2 (two) times daily.      D3-50 1.25 MG (61716 UT) Oral Cap TAKE ONE CAPSULE BY MOUTH ONCE A WEEK WITH MEALS      FLUoxetine 20 MG Oral Cap Take 1 capsule (20 mg total) by mouth 2 (two) times daily.      zolpidem 10 MG Oral Tab Take 2 tablets (20 mg total) by mouth nightly as needed. Patient take 1 table prn      lidocaine 5 % External Patch Place 1 patch onto the skin daily. (Patient not taking: Reported on 12/16/2024) 7 patch 0    Elastic Bandages & Supports (POSTURE SUPPORT/ELASTIC XL) Does not apply Misc Wear during the day as tolerates 1 each 0    benzonatate 200 MG Oral Cap Take 1 capsule (200 mg total) by mouth 3 (three) times daily as needed for cough. (Patient not taking: Reported on 11/6/2024) 30 capsule 0       Allergies:  Allergies[1]      ROS:     Constitutional:  Negative for decreased activity, fever, irritability and lethargy  ENMT:  Negative for ear drainage, hearing loss and nasal drainage  Eyes:  Negative for eye discharge and vision loss  Cardiovascular:  Negative for chest pain, sobs  Respiratory:  Negative for cough, dyspnea and wheezing  Gastrointestinal:  Negative for abdominal pain, constipation  Genitourinary:  Negative for dysuria and hematuria  Endocrine:  Negative for abnormal sleep patterns, increased activity  Hema/Lymph:  Negative for  easy bleeding and easy bruising  Integumentary:  Negative for pruritus and rash  Musculoskeletal:  Negative for bone/joint symptoms  Neurological:  Negative for gait disturbance  Psychiatric:  Negative for inappropriate interaction and psychiatric symptoms      Vitals:    12/16/24 1401   BP: 97/49   Pulse: 76       PHYSICAL EXAM:   Constitutional: appears well hydrated alert and responsive no acute distress noted  Head/Face: normocephalic  Eyes/Vision: normal extraocular motion is intact  Nose/Mouth/Throat:mucous membranes are moist   Neck/Thyroid: neck is supple without adenopathy  Lymphatic: no abnormal cervical, supraclavicular adenopathy is noted  Respiratory:  lungs are clear to auscultation bilaterally  Cardiovascular: regular rate and rhythm  Abdomen: soft, non-tender, non-distended, BS normal  Skin/Hair: no unusual rashes present  Back/Spine: no abnormalities noted  Musculoskeletal:  no deformities  Extremities: no edema  Neurological:  Grossly normal    Lab Results   Component Value Date     04/10/2024     12/30/2011     12/29/2011    K 4.6 04/10/2024    K 4.4 12/30/2011    K 4.2 12/29/2011     04/10/2024     (H) 12/30/2011     12/29/2011    CO2 24.0 04/10/2024    CO2 24.0 12/30/2011    CO2 24.0 12/29/2011    BUN 16 04/10/2024    BUN 11 12/30/2011    BUN 12 12/29/2011    CREATSERUM 1.33 (H) 04/10/2024    CREATSERUM 1.0 12/30/2011    CREATSERUM 1.2 (H) 12/29/2011    CA 9.8 04/10/2024    CA 8.8 (L) 12/30/2011    CA 9.4 12/29/2011    EGFRCR 39 (L) 11/01/2024    EGFRCR 45 (L) 04/10/2024    ALB 4.2 04/10/2024    ALB 3.8 12/29/2011      ASSESSMENT/PLAN:   Assessment   1. Stage 3 chronic kidney disease, unspecified whether stage 3a or 3b CKD (HCC)  - Renal Function Panel; Future  - CBC With Differential With Platelet; Future  - PTH, Intact; Future  - Protein/Creatinine Ratio, Urine Random; Future  - Urinalysis, Routine; Future  - US KIDNEY/BLADDER (CPT=76770); Future    2. Pure  hypercholesterolemia    3. Polyarthralgia    4. Secondary hyperparathyroidism of renal origin (HCC)    5. 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  Renal fx has been stable, however recent drop in eGFR noted  UA is bland  Will recheck labs and renal us and UA  Avoid nsaids  Hydrate well    Hyperlipidemia  On statin    Hypothyroid  On lt4    Bipolar  Off lithium    Arthritis  Avid nsaids    Sec hpth  Monitor ca/phosp ipth    PLAN    Labs today  Labs and fu in 3 mths       Orders This Visit:  Orders Placed This Encounter   Procedures    Renal Function Panel    CBC With Differential With Platelet    PTH, Intact    Protein/Creatinine Ratio, Urine Random    Urinalysis, Routine       Meds This Visit:  Requested Prescriptions      No prescriptions requested or ordered in this encounter       Imaging & Referrals:  US KIDNEY/BLADDER (HZA=[id number])     Mikaela Arriola MD  12/16/2024            [1]   Allergies  Allergen Reactions    Dust Mite Extract UNKNOWN     Seasonal    Penicillin G OTHER (SEE COMMENTS)     itchiness

## 2024-12-16 NOTE — ED PROVIDER NOTES
Patient Seen in: [facility] [place]      History     Chief Complaint   Patient presents with    FB in Throat            Stated Complaint: neck pain    Subjective:   HPI      Patient is a 64-year-old female with past history of stage III chronic kidney disease, thyroid problems who presents now with left neck swelling.  The patient states she awoke this morning with some pain in tenderness in the left lateral neck.  The patient denies any foreign body sensation.  Patient denies any choking.  The patient denies any sore throat.  Patient is concerned that this may represent an extension of her thyroid.  The patient had a ultrasound of her thyroid on 11/30/2024 which demonstrated thyroiditis, no thyroid nodule.        Objective:     History reviewed. No pertinent past medical history.           Past Surgical History:   Procedure Laterality Date    Kristine biopsy stereo nodule 1 site left (cpt=19081)  07/24/2024    x clip, calcs                No pertinent social history.            Review of Systems    Positive for stated complaint: neck pain  Other systems are as noted in HPI.  Constitutional and vital signs reviewed.      All other systems reviewed and negative except as noted above.    Physical Exam     ED Triage Vitals [12/16/24 1725]   /65   Pulse 77   Resp 18   Temp 98.1 °F (36.7 °C)   Temp src Oral   SpO2 96 %   O2 Device None (Room air)       Current Vitals:   Vital Signs  BP: 106/65  Pulse: 77  Resp: 18  Temp: 98.1 °F (36.7 °C)  Temp src: Oral    Oxygen Therapy  SpO2: 96 %  O2 Device: None (Room air)        Physical Exam    Constitutional: Well-developed well-nourished in no acute distress  Head: Normocephalic, no swelling or tenderness  Neck: No palpable thyroid.  Small, approximately 0.5 cm left anterior cervical lymph node with mild tenderness.  No additional lymphadenopathy appreciated  Eyes: Nonicteric sclera, no conjunctival injection  ENT: TMs are clear and flat bilaterally.  There is no  posterior pharyngeal erythema  Chest: Clear to auscultation, no tenderness  Cardiovascular: Regular rate and rhythm without murmur  Abdomen: Soft, nontender and nondistended  Neurologic: Patient is awake, alert and oriented ×3.  The patient's motor strength is 5 out of 5 and symmetric in the upper and lower extremities bilaterally  Extremities: No focal swelling or tenderness  Skin: No pallor, no redness or warmth to the touch      ED Course     Labs Reviewed   RAPID STREP A - Normal            The patient's negative strep was reviewed with her.  Patient has a single isolated small cervical lymph node with no additional lymphadenopathy.  Most likely reactive.  Recommend follow-up for any worsening symptoms in 1 week.       MDM      Reactive lymphadenopathy versus strep throat versus viral pharyngitis        Medical Decision Making      Disposition and Plan     Clinical Impression:  1. Anterior cervical lymphadenopathy         Disposition:  Discharge  12/16/2024  5:49 pm    Follow-up:  Fam Cerna MD  [address] [place] [id number] [phone]    In 1 week  Any persistent pain or swelling of the left lymph node          Medications Prescribed:  Current Discharge Medication List              Supplementary Documentation:

## 2024-12-16 NOTE — ED INITIAL ASSESSMENT (HPI)
Patient reports fb sensation to left side of throat starting this am.  Area is tender to touch.

## 2024-12-19 NOTE — PROGRESS NOTES
[facility]    Telemedicine Visit - Follow Up Evaluation    Telehealth Verbal Consent   I conducted a telehealth visit with Elin Walker today, 12/4/24, which was completed using two-way, real-time interactive audio and video communication. This has been done in good henrik to provide continuity of care in the best interest of the provider-patient relationship, due to the COVID - public health crisis/national emergency where restrictions of face-to-face office visits are ongoing. Every conscious effort was taken to allow for sufficient and adequate time to complete the visit.  The patient was made aware of the limitations of the telehealth visit, including treatment limitations as no physical exam could be performed.  The patient was advised to call 911 or to go to the ER in case there was an emergency.  The patient was also advised of the potential privacy & security concerns related to the telehealth platform.   The patient was made aware of where to find [facility]'s notice of privacy practices, telehealth consent form and other related consent forms and documents.  which are located on the formerly [facility] website. The patient verbally agreed to telehealth consent form, related consents and the risks discussed.    Lastly, the patient confirmed that they were in [place].   Included in this visit, time may have been spent reviewing labs, medications, radiology tests and decision making. Appropriate medical decision-making and tests are ordered as detailed in the plan of care above.  Coding/billing information is submitted for this visit based on complexity of care and/or time spent for the visit.      HISTORY OF PRESENT ILLNESS:     Patient is following up for neck pain.  Patient has had MRI imaging of the cervical and lumbar spine which she is here to review today.  She continues to have a neck and low back pain in the axial cervical and lumbar spine respectively.  She denies  any radiating symptoms, change in strength or bowel bladder.    IMAGING:   MRI cervical and lumbar spine completed 11/30/2024 was personally reviewed which is notable for mild degenerative changes in the cervical spine with trace posterior disc osteophyte complex at C4-5 with severe right and moderate left facet arthropathy.  There is no canal or foraminal narrowing.  There is a small central disc protrusion at C5-6 with posterior disc osteophyte complex and moderate bilateral facet arthropathy.  There is mild to moderate facet arthropathy at C6-7.  There is moderate to severe bilateral facet arthropathy at L4-5 with trace disc bulge in the lumbar spine and moderate to severe left and moderate right facet arthropathy L5-S1.      All imaging results were reviewed and discussed with patient.      ASSESSMENT:     1. Arthropathy of cervical facet joint    2. Lumbar facet arthropathy          PLAN:   Elin Walker is a 64 year old female following up for localized axial cervical and lumbar pain with facet arthropathy as noted on MRI imaging.  I recommended PT program with home exercises.  Recommended continuing topical treatment with ice and heat as tolerated and Tylenol and gabapentin as needed.  Recommend she follow-up with me in 2 months in the office.  If no better we will discuss further treatment options.      Follow-up:  2 months    We discussed that a telemedicine visit is in place of an office visit; however, this limits the ability to perform a thorough physical examination which may affect objective findings related to a specific condition and can affect treatment.    The patient verbalized understanding with this plan and was in agreement.  There are no barriers to learning.  All questions were answered.  Please note Dragon dictation software was used to dictate this note which may result in inadvertent typos.    Beni Kirk D.O. FAAPMR & CAQSM  Physical Medicine and Rehabilitation/Sports Medicine    PAST  MEDICAL HISTORY:   No past medical history on file.      PAST SURGICAL HISTORY:     Past Surgical History:   Procedure Laterality Date    Kristine biopsy stereo nodule 1 site left (cpt=19081)  07/24/2024    x clip, calcs         CURRENT MEDICATIONS:     Current Outpatient Medications   Medication Sig Dispense Refill    benztropine 1 MG Oral Tab Take 1 tablet (1 mg total) by mouth 2 (two) times daily.      gabapentin 300 MG Oral Cap Take 1 capsule (300 mg total) by mouth 3 (three) times daily.      OXcarbazepine 150 MG Oral Tab Take 2 tablets (300 mg total) by mouth 2 (two) times daily.      atorvastatin 20 MG Oral Tab Take 1 tablet (20 mg total) by mouth daily.      SUMAtriptan 50 MG Oral Tab Take  1 tablet orally  with migraine  headache   as needed, but no more than  2  tablets per week 9 tablet 1    lidocaine 5 % External Patch Place 1 patch onto the skin daily. (Patient not taking: Reported on 12/16/2024) 7 patch 0    levothyroxine 150 MCG Oral Tab Take 1 tablet (150 mcg total) by mouth before breakfast. DISCONTINUE LEVOTHYROXINE 200 MCG DAILY 90 tablet 3    diazePAM 10 MG Oral Tab Take 1 tablet (10 mg total) by mouth daily as needed.      traZODone 150 MG Oral Tab Take 2 tablets (300 mg total) by mouth every evening.      Elastic Bandages & Supports (POSTURE SUPPORT/ELASTIC XL) Does not apply Misc Wear during the day as tolerates 1 each 0    albuterol 108 (90 Base) MCG/ACT Inhalation Aero Soln Inhale 2 puffs into the lungs every 4 (four) hours as needed for Wheezing or Shortness of Breath. 18 g 5    benzonatate 200 MG Oral Cap Take 1 capsule (200 mg total) by mouth 3 (three) times daily as needed for cough. (Patient not taking: Reported on 11/6/2024) 30 capsule 0    Albuterol Sulfate  (90 Base) MCG/ACT Inhalation Aero Soln Inhale 2 puffs into the lungs every 4 to 6 hours as needed.      SYMBICORT 160-4.5 MCG/ACT Inhalation Aerosol Inhale 2 puffs into the lungs 2 (two) times daily.      D3-50 1.25 MG (13034  UT) Oral Cap TAKE ONE CAPSULE BY MOUTH ONCE A WEEK WITH MEALS      FLUoxetine 20 MG Oral Cap Take 1 capsule (20 mg total) by mouth 2 (two) times daily.      zolpidem 10 MG Oral Tab Take 2 tablets (20 mg total) by mouth nightly as needed. Patient take 1 table prn           ALLERGIES:   Allergies[1]      FAMILY HISTORY:     Family History   Problem Relation Age of Onset    Other (Other) Father         Renal Failure    Other (Other) Mother         Grave's Disease    Cancer Mother         lung    Cancer Maternal Grandmother         Stomach    Other (Other) Maternal Grandfather         Heart Failure    Ovarian Cancer Paternal Grandmother         AGE UNKNOWN    Cancer Paternal Grandmother     Skin cancer Sister     Cancer Brother         Lung cancer    Infectious Disease Other           SOCIAL HISTORY:     Social History     Socioeconomic History    Marital status: Single   Tobacco Use    Smoking status: Some Days     Types: Cigarettes    Tobacco comments:     On and off   Vaping Use    Vaping status: Never Used   Substance and Sexual Activity    Alcohol use: Never    Drug use: Never   Other Topics Concern    Grew up on a farm No    History of tanning Yes    Outdoor occupation No    Breast feeding No    Reaction to local anesthetic No    Pt has a pacemaker No    Pt has a defibrillator No     Social Drivers of Health     Financial Resource Strain: Low Risk  (5/7/2023)    Received from [employer], [facility]    Financial Resource Strain     In the past year, have you or any family members you live with been unable to get any of the following when it was really needed? Check all that apply.: None   Food Insecurity: Not At Risk (5/7/2023)    Received from [employer], [facility]    Food Insecurity     RETIRE How often in the past 12 months would you say you are worried or stressed about having enough money to buy nutritious meals? : Never   Transportation Needs: No Transportation  Needs (5/7/2023)    [facility] from [facility], [facility], [facility], [facility]    PRAPARE - Transportation     In the past 12 months, has lack of transportation kept you from medical appointments or from getting medications?: No     In the past 12 months, has lack of transportation kept you from meetings, work, or from getting things needed for daily living?: No   Social Connections: Low Risk  (5/8/2023)    Received from [facility], [facility]    Social Connections     How often do you see or talk to people that you care about and feel close to? (For example: talking to friends on the phone, visiting friends or family, going to [employer] or club meetings): 5 or more times a week          REVIEW OF SYSTEMS:   As noted in HPI      PHYSICAL EXAM:   General: No immediate distress  Head: Normocephalic/ Atraumatic  Eyes: Extra-occular movements intact  Ears/Nose/Throat:  External appearance identifies normal appearance without obvious deformity  Cardiovascular: No cyanosis, clubbing or edema  Respiratory: Non-labored respirations  Skin: No lesions noted   Neurological: alert & oriented x 3, attentive, able to follow commands, comprehention intact, spontaneous speech intact  Psychiatric: Mood and affect appropriate  Musculoskeletal Exam:  No change since last exam        LABS:   No results found for: \"EAG\", \"A1C\"  Lab Results   Component Value Date    WBC 10.6 04/10/2024    RBC 5.00 04/10/2024    HGB 15.9 04/10/2024    HCT 45.7 04/10/2024    MCV 94.7 11/01/2024    MCH 31.8 04/10/2024    MCHC 33.7 11/01/2024    RDW 11.5 04/10/2024    .0 04/10/2024    MPV 9.6 12/29/2011     Lab Results   Component Value Date    GLU 93 04/10/2024    BUN 16 04/10/2024    BUNCREA 12.0 04/10/2024    CREATSERUM 1.33 (H) 04/10/2024    ANIONGAP 5 04/10/2024    GFRNAA 63 12/30/2011    GFRAA 76 12/30/2011    CA 9.8 04/10/2024    OSMOCALC 289 04/10/2024    ALKPHO 125  04/10/2024    AST 19 04/10/2024    ALT 10 04/10/2024    BILT 0.3 04/10/2024    TP 7.3 04/10/2024    ALB 4.2 04/10/2024    GLOBULIN 3.1 04/10/2024     04/10/2024    K 4.6 04/10/2024     04/10/2024    CO2 24.0 04/10/2024     No results found for: \"PTP\", \"PT\", \"INR\"  Lab Results   Component Value Date    VITD 24.4 (L) 07/16/2024              [1]   Allergies  Allergen Reactions    Dust Mite Extract UNKNOWN     Seasonal    Penicillin G OTHER (SEE COMMENTS)     itchiness

## 2025-01-07 ENCOUNTER — HOSPITAL ENCOUNTER (OUTPATIENT)
Dept: NUCLEAR MEDICINE | Facility: HOSPITAL | Age: 65
Discharge: HOME | End: 2025-01-07
Attending: INTERNAL MEDICINE
Payer: MEDICARE

## 2025-01-07 DIAGNOSIS — F17.200 TOBACCO DEPENDENCE: ICD-10-CM

## 2025-01-07 DIAGNOSIS — R91.1 NODULE OF RIGHT LUNG: ICD-10-CM

## 2025-01-07 DIAGNOSIS — R93.89 ABNORMAL CT OF THE CHEST: ICD-10-CM

## 2025-01-07 DIAGNOSIS — J43.8 OTHER EMPHYSEMA (HCC): ICD-10-CM

## 2025-01-07 DIAGNOSIS — R91.1 LUNG NODULE: ICD-10-CM

## 2025-01-07 LAB — GLUCOSE BLDC GLUCOMTR-MCNC: 130 MG/DL (ref 70–99)

## 2025-01-07 PROCEDURE — 78815 PET IMAGE W/CT SKULL-THIGH: CPT | Performed by: INTERNAL MEDICINE

## 2025-01-07 PROCEDURE — 82962 GLUCOSE BLOOD TEST: CPT

## 2025-01-21 ENCOUNTER — HOSPITAL ENCOUNTER (OUTPATIENT)
Dept: ULTRASOUND IMAGING | Age: 65
Discharge: HOME | End: 2025-01-21
Attending: INTERNAL MEDICINE
Payer: MEDICARE

## 2025-01-21 ENCOUNTER — LAB ENCOUNTER (OUTPATIENT)
Dept: LAB | Age: 65
End: 2025-01-21
Attending: INTERNAL MEDICINE
Payer: MEDICARE

## 2025-01-21 DIAGNOSIS — N18.30 STAGE 3 CHRONIC KIDNEY DISEASE, UNSPECIFIED WHETHER STAGE 3A OR 3B CKD (HCC): ICD-10-CM

## 2025-01-21 LAB
ALBUMIN SERPL-MCNC: 4.4 G/DL (ref 3.2–4.8)
ANION GAP SERPL CALC-SCNC: 9 MMOL/L (ref 0–18)
BASOPHILS # BLD AUTO: 0.08 X10(3) UL (ref 0–0.2)
BASOPHILS NFR BLD AUTO: 0.9 %
BILIRUB UR QL: NEGATIVE
BUN BLD-MCNC: 20 MG/DL (ref 9–23)
BUN/CREAT SERPL: 14.6 (ref 10–20)
CALCIUM BLD-MCNC: 9.9 MG/DL (ref 8.7–10.4)
CHLORIDE SERPL-SCNC: 107 MMOL/L (ref 98–112)
CO2 SERPL-SCNC: 25 MMOL/L (ref 21–32)
CREAT BLD-MCNC: 1.37 MG/DL
CREAT UR-SCNC: 32.2 MG/DL
DEPRECATED RDW RBC AUTO: 44.2 FL (ref 35.1–46.3)
EGFRCR SERPLBLD CKD-EPI 2021: 43 ML/MIN/1.73M2 (ref 60–?)
EOSINOPHIL # BLD AUTO: 0.24 X10(3) UL (ref 0–0.7)
EOSINOPHIL NFR BLD AUTO: 2.8 %
ERYTHROCYTE [DISTWIDTH] IN BLOOD BY AUTOMATED COUNT: 12.3 % (ref 11–15)
GLUCOSE BLD-MCNC: 105 MG/DL (ref 70–99)
GLUCOSE UR-MCNC: NORMAL MG/DL
HCT VFR BLD AUTO: 41.8 %
HGB BLD-MCNC: 14.3 G/DL
HGB UR QL STRIP.AUTO: NEGATIVE
IMM GRANULOCYTES # BLD AUTO: 0.03 X10(3) UL (ref 0–1)
IMM GRANULOCYTES NFR BLD: 0.3 %
KETONES UR-MCNC: NEGATIVE MG/DL
LEUKOCYTE ESTERASE UR QL STRIP.AUTO: NEGATIVE
LYMPHOCYTES # BLD AUTO: 2.03 X10(3) UL (ref 1–4)
LYMPHOCYTES NFR BLD AUTO: 23.4 %
MCH RBC QN AUTO: 33.1 PG (ref 26–34)
MCHC RBC AUTO-ENTMCNC: 34.2 G/DL (ref 31–37)
MCV RBC AUTO: 96.8 FL
MONOCYTES # BLD AUTO: 0.5 X10(3) UL (ref 0.1–1)
MONOCYTES NFR BLD AUTO: 5.8 %
NEUTROPHILS # BLD AUTO: 5.81 X10 (3) UL (ref 1.5–7.7)
NEUTROPHILS # BLD AUTO: 5.81 X10(3) UL (ref 1.5–7.7)
NEUTROPHILS NFR BLD AUTO: 66.8 %
NITRITE UR QL STRIP.AUTO: NEGATIVE
OSMOLALITY SERPL CALC.SUM OF ELEC: 295 MOSM/KG (ref 275–295)
PH UR: 5.5 [PH] (ref 5–8)
PHOSPHATE SERPL-MCNC: 4.8 MG/DL (ref 2.4–5.1)
PLATELET # BLD AUTO: 289 10(3)UL (ref 150–450)
POTASSIUM SERPL-SCNC: 4.7 MMOL/L (ref 3.5–5.1)
PROT UR-MCNC: <6 MG/DL (ref ?–14)
PROT UR-MCNC: NEGATIVE MG/DL
PTH-INTACT SERPL-MCNC: 71.9 PG/ML (ref 18.5–88)
RBC # BLD AUTO: 4.32 X10(6)UL
SODIUM SERPL-SCNC: 141 MMOL/L (ref 136–145)
SP GR UR STRIP: 1.01 (ref 1–1.03)
UROBILINOGEN UR STRIP-ACNC: NORMAL
WBC # BLD AUTO: 8.7 X10(3) UL (ref 4–11)

## 2025-01-21 PROCEDURE — 81001 URINALYSIS AUTO W/SCOPE: CPT

## 2025-01-21 PROCEDURE — 85025 COMPLETE CBC W/AUTO DIFF WBC: CPT

## 2025-01-21 PROCEDURE — 83970 ASSAY OF PARATHORMONE: CPT

## 2025-01-21 PROCEDURE — 36415 COLL VENOUS BLD VENIPUNCTURE: CPT

## 2025-01-21 PROCEDURE — 76770 US EXAM ABDO BACK WALL COMP: CPT | Performed by: INTERNAL MEDICINE

## 2025-01-21 PROCEDURE — 80069 RENAL FUNCTION PANEL: CPT

## 2025-01-21 PROCEDURE — 84156 ASSAY OF PROTEIN URINE: CPT

## 2025-01-21 PROCEDURE — 82570 ASSAY OF URINE CREATININE: CPT

## 2025-01-29 ENCOUNTER — NURSE TRIAGE (OUTPATIENT)
Dept: INTERNAL MEDICINE CLINIC | Facility: CLINIC | Age: 65
End: 2025-01-29

## 2025-01-29 DIAGNOSIS — N18.30 STAGE 3 CHRONIC KIDNEY DISEASE, UNSPECIFIED WHETHER STAGE 3A OR 3B CKD (HCC): Primary | ICD-10-CM

## 2025-01-29 RX ORDER — ESOMEPRAZOLE MAGNESIUM 40 MG/1
40 CAPSULE, DELAYED RELEASE ORAL DAILY
Qty: 30 CAPSULE | Refills: 0 | Status: SHIPPED | OUTPATIENT
Start: 2025-01-29

## 2025-01-29 NOTE — TELEPHONE ENCOUNTER
Action Requested: Summary for Provider     []  Critical Lab, Recommendations Needed  [x] Need Additional Advice  []   FYI    []   Need Orders  [] Need Medications Sent to Pharmacy  []  Other     SUMMARY:   Please advice.  An appointment was offered and the patient stated has been seeing so many doctors that she will follow up with Dr. Cerna after seeing them all and all her tests are done.    Dr. Cerna is out of the office sent to [facility] also    She is asking for Nexium to be prescribed for her.    Per the patient she has had these symptoms years ago  and was placed on Nexium but was taken off for she was better.    Her \"esophagus pain\" is intermittent but occurs all day long and can keep her p at night.  She also does not have an appetite.  Per the patient the pain is in the middle of her chest.    Per the patient it starts  with the first meal of the day which is usually late in the day and the first bite is painful.  She than has burning and an uncomfortable feeling when eating.   She has to drink water or mild to help the pain.   Pepto Bismol is not helping anymore.    Instructed if the pain is continuous and last over 2 hours will need to be seen in the emergency room with understanding.    Reason for call: Heartburn  Onset: Data Unavailable      General Assessment (questions to ask the caller)  Do you consider this a medical emergency?: No  Can you access 911?: Yes  Do you have any pain?: Yes  What is the severity of your pain? (Scale 1-10): 6  Do you have a fever?: No  What is the date of your last medical exam?: 11/06/24  Additional Assessments  Are these symptoms new, recurrent, or chronic?: recurrent        Reason for Disposition   MILD pain that comes and goes (cramps) > 72 hours  (Exception: This same abdominal pain is a chronic symptom recurrent or ongoing AND present > 4 weeks.)    Protocols used: Abdominal Pain - Upper-A-[place]

## 2025-01-29 NOTE — TELEPHONE ENCOUNTER
Please call patient I sent prescription for Nexium 40 mg for 30 days, it is over-the-counter medication now if insurance does not cover we will send pantoprazole 40 mg daily. Please advise patient that heartburn IN women can be a sign of cardiac condition so I urgeher to be seen as soon as possible, if Dr. Cerna is not available she should be seen by any available provider at the clinic to look at the cardiac part of HER symptoms.  She has cardiologist she should see cardiologist.  She does have elevated cholesterol according to the records so it puts her at higher risk for possible cardiac condition .please advise her If pain intensifies she should go to the emergency room rule out heart attack, but otherwise I would recommend outpatient evaluation with a cardiac testing in her case.

## 2025-01-29 NOTE — TELEPHONE ENCOUNTER
Advised patient of Dr. Santiago's note. Patient verbalized understanding. Declined appointment offered tomorrow. Appointment made for 1/31/2025 at 1:40pm with Dr Vazquez in [place].

## 2025-02-01 PROBLEM — J21.9 ACUTE BRONCHIOLITIS: Status: ACTIVE | Noted: 2025-02-01

## 2025-02-19 RX ORDER — ATORVASTATIN CALCIUM 20 MG/1
20 TABLET, FILM COATED ORAL DAILY
Qty: 90 TABLET | Refills: 3 | OUTPATIENT
Start: 2025-02-19

## 2025-03-18 ENCOUNTER — OFFICE VISIT (OUTPATIENT)
Facility: CLINIC | Age: 65
End: 2025-03-18

## 2025-03-18 VITALS
BODY MASS INDEX: 39 KG/M2 | SYSTOLIC BLOOD PRESSURE: 97 MMHG | WEIGHT: 213 LBS | HEART RATE: 87 BPM | DIASTOLIC BLOOD PRESSURE: 66 MMHG

## 2025-03-18 DIAGNOSIS — E78.00 PURE HYPERCHOLESTEROLEMIA: ICD-10-CM

## 2025-03-18 DIAGNOSIS — N25.81 SECONDARY HYPERPARATHYROIDISM OF RENAL ORIGIN (HCC): ICD-10-CM

## 2025-03-18 DIAGNOSIS — E03.9 HYPOTHYROIDISM, UNSPECIFIED TYPE: ICD-10-CM

## 2025-03-18 DIAGNOSIS — M25.50 POLYARTHRALGIA: ICD-10-CM

## 2025-03-18 DIAGNOSIS — N18.30 STAGE 3 CHRONIC KIDNEY DISEASE, UNSPECIFIED WHETHER STAGE 3A OR 3B CKD (HCC): Primary | ICD-10-CM

## 2025-03-18 PROCEDURE — 99214 OFFICE O/P EST MOD 30 MIN: CPT | Performed by: INTERNAL MEDICINE

## 2025-03-18 NOTE — PROGRESS NOTES
[facility]    Progress Note     Elin Walker    66 y/o F with h/o HLD, hypothyroidism, bipolar, migraine, severe arthritis/ scoliosis,  and CKD 3 here for initial evaluation  Seen a  Nephrologist many yrs back and was told she has ckd.  No h/o HTN, DM, . Recently had sharp pain on right flank  Previously was on nsaids , however stopped  Was on lithium for 10 + yrs in past and was stopped bc of worsening cr and been told ckd bc of that.  No FH of esrd    Labs reviewed  Nov 2024 ct abd- unremarkable kidneys  eGFR done in nov shows to have dropped to 39 ml/min    Cr in April 2024 1.3 mg/dl, eGFR 45 ml/min  In 2023 cr 1.0-1.3 mg/dl eGFR 48 ml/min    Since lov  C/o pain  No new issues    With a walker    HISTORY:  History reviewed. No pertinent past medical history.   Past Surgical History:   Procedure Laterality Date    Kristine biopsy stereo nodule 1 site left (cpt=19081)  07/24/2024    x clip, calcs           Medications (Active prior to today's visit):  Current Outpatient Medications   Medication Sig Dispense Refill    Esomeprazole Magnesium (NEXIUM) 40 MG Oral Capsule Delayed Release Take 1 capsule (40 mg total) by mouth daily. 30 capsule 0    benztropine 1 MG Oral Tab Take 1 tablet (1 mg total) by mouth 2 (two) times daily.      gabapentin 300 MG Oral Cap Take 1 capsule (300 mg total) by mouth 3 (three) times daily.      atorvastatin 20 MG Oral Tab Take 1 tablet (20 mg total) by mouth daily.      SUMAtriptan 50 MG Oral Tab Take  1 tablet orally  with migraine  headache   as needed, but no more than  2  tablets per week 9 tablet 1    levothyroxine 150 MCG Oral Tab Take 1 tablet (150 mcg total) by mouth before breakfast. DISCONTINUE LEVOTHYROXINE 200 MCG DAILY 90 tablet 3    diazePAM 10 MG Oral Tab Take 1 tablet (10 mg total) by mouth daily as needed.      traZODone 150 MG Oral Tab Take 2 tablets (300 mg total) by mouth every evening.      albuterol 108 (90 Base) MCG/ACT Inhalation Aero Soln Inhale 2  puffs into the lungs every 4 (four) hours as needed for Wheezing or Shortness of Breath. 18 g 5    benzonatate 200 MG Oral Cap Take 1 capsule (200 mg total) by mouth 3 (three) times daily as needed for cough. 30 capsule 0    SYMBICORT 160-4.5 MCG/ACT Inhalation Aerosol Inhale 2 puffs into the lungs 2 (two) times daily.      D3-50 1.25 MG (80837 UT) Oral Cap TAKE ONE CAPSULE BY MOUTH ONCE A WEEK WITH MEALS      FLUoxetine 20 MG Oral Cap Take 1 capsule (20 mg total) by mouth 2 (two) times daily.      zolpidem 10 MG Oral Tab Take 2 tablets (20 mg total) by mouth nightly as needed. Patient take 1 table prn      OXcarbazepine 150 MG Oral Tab Take 2 tablets (300 mg total) by mouth 2 (two) times daily.      lidocaine 5 % External Patch Place 1 patch onto the skin daily. (Patient not taking: Reported on 3/18/2025) 7 patch 0    Elastic Bandages & Supports (POSTURE SUPPORT/ELASTIC XL) Does not apply Misc Wear during the day as tolerates 1 each 0    Albuterol Sulfate  (90 Base) MCG/ACT Inhalation Aero Soln Inhale 2 puffs into the lungs every 4 to 6 hours as needed.         Allergies:  Allergies[1]      ROS:     Constitutional:  Negative for decreased activity, fever, irritability and lethargy  ENMT:  Negative for ear drainage, hearing loss and nasal drainage  Eyes:  Negative for eye discharge and vision loss  Cardiovascular:  Negative for chest pain, sob  Respiratory:  Negative for cough, dyspnea and wheezing  Gastrointestinal:  Negative for abdominal pain, constipation  Genitourinary:  Negative for dysuria and hematuria  Endocrine:  Negative for abnormal sleep patterns  Hema/Lymph:  Negative for easy bleeding and easy bruising  Integumentary:  Negative for pruritus and rash  Musculoskeletal:  Negative for bone/joint symptoms  Neurological:  Negative for gait disturbance  Psychiatric:  Negative for inappropriate interaction and psychiatric symptoms      Vitals:    03/18/25 1444   BP: 97/66   Pulse: 87       PHYSICAL  EXAM:   Constitutional: appears well hydrated alert and responsive   Head/Face: normocephalic  Eyes/Vision: normal extraocular motion is intact  Nose/Mouth/Throat:mucous membranes are moist   Neck/Thyroid: neck is supple without adenopathy  Respiratory:  lungs are clear to auscultation bilaterally  Cardiovascular: regular rate and rhythm   Abdomen: soft, non-tender, non-distended, BS normal  Skin/Hair: no unusual rashes present, no abnormal bruising noted  Musculoskeletal: no deformities  Extremities: no edema  Neurological:  Grossly normal      Lab Results   Component Value Date     01/21/2025     04/10/2024     12/30/2011    K 4.7 01/21/2025    K 4.6 04/10/2024    K 4.4 12/30/2011     01/21/2025     04/10/2024     (H) 12/30/2011    CO2 25.0 01/21/2025    CO2 24.0 04/10/2024    CO2 24.0 12/30/2011    BUN 20 01/21/2025    BUN 16 04/10/2024    BUN 11 12/30/2011    CREATSERUM 1.37 (H) 01/21/2025    CREATSERUM 1.33 (H) 04/10/2024    CREATSERUM 1.0 12/30/2011    CA 9.9 01/21/2025    CA 9.8 04/10/2024    CA 8.8 (L) 12/30/2011    EGFRCR 43 (L) 01/21/2025    EGFRCR 39 (L) 11/01/2024    EGFRCR 45 (L) 04/10/2024    ALB 4.4 01/21/2025    ALB 4.2 04/10/2024    ALB 3.8 12/29/2011    PHOS 4.8 01/21/2025    PTH 71.9 01/21/2025          ASSESSMENT/PLAN:   Assessment   1. Stage 3 chronic kidney disease, unspecified whether stage 3a or 3b CKD (HCC)    2. Pure hypercholesterolemia    3. Polyarthralgia    4. Secondary hyperparathyroidism of renal origin (HCC)    5. 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 No proteinuria  Renal us negative  Avoid nsaids  Hydrate well  Repeat labs are better cr 1.3 mg/dl , eGFR 43 ml/min    Hyperlipidemia  On statin    Hypothyroid  On lt4    Bipolar  Off lithium    Arthritis  Avid nsaids    Sec hpth  Monitor ca/phosp,  ipth 71    Obesity  Recommend weight loss  Ok to take GLP1 agonist    PLAN  Labs today  Labs and fu in 6 months         Orders This Visit:  No orders of the defined types were placed in this encounter.      Meds This Visit:  Requested Prescriptions      No prescriptions requested or ordered in this encounter       Imaging & Referrals:  None       Mikaela Arriola MD  3/18/2025            [1]   Allergies  Allergen Reactions    Dust Mite Extract UNKNOWN     Seasonal    Penicillin G OTHER (SEE COMMENTS)     itchiness

## 2025-03-24 RX ORDER — ESOMEPRAZOLE MAGNESIUM 40 MG/1
40 CAPSULE, DELAYED RELEASE ORAL DAILY
Qty: 30 CAPSULE | Refills: 0 | Status: SHIPPED | OUTPATIENT
Start: 2025-03-24

## 2025-03-24 NOTE — TELEPHONE ENCOUNTER
Refill passed per [facility] protocol.    Please advise if is appropriate to send another refill to Pharmacy.Short supply was written by Dr. Santiago on 01/29/2025.    Last Office Visit: Telemedicine visit with Dr. Santiago 12/3/2024  Please see Nurse Triage Encounter from 01/29/2025  Josefina Santiago MD to Em Rn Triage    1/29/25  4:54 PM  Please call patient I sent prescription for Nexium 40 mg for 30 days, it is over-the-counter medication now if insurance does not cover we will send pantoprazole 40 mg daily. Please advise patient that heartburn IN women can be a sign of cardiac condition so I urgeher to be seen as soon as possible, if Dr. Cerna is not available she should be seen by any available provider at the clinic to look at the cardiac part of HER symptoms.  She has cardiologist she should see cardiologist.  She does have elevated cholesterol according to the records so it puts her at higher risk for possible cardiac condition .please advise her If pain intensifies she should go to the emergency room rule out heart attack, but otherwise I would recommend outpatient evaluation with a cardiac testing in her case.        Joy Keita, RN    1/29/25  5:52 PM    Advised patient of Dr. Santiago's note. Patient verbalized understanding. Declined appointment offered tomorrow. Appointment made for 1/31/2025 at 1:40pm with Dr Vazquez in [place].

## 2025-03-25 RX ORDER — SUMATRIPTAN 50 MG/1
TABLET, FILM COATED ORAL
Qty: 9 TABLET | Refills: 1 | Status: SHIPPED | OUTPATIENT
Start: 2025-03-25

## 2025-04-14 ENCOUNTER — TELEPHONE (OUTPATIENT)
Dept: SURGERY | Facility: CLINIC | Age: 65
End: 2025-04-14

## 2025-04-29 RX ORDER — ESOMEPRAZOLE MAGNESIUM 40 MG/1
40 CAPSULE, DELAYED RELEASE ORAL DAILY
Qty: 90 CAPSULE | Refills: 0 | Status: SHIPPED | OUTPATIENT
Start: 2025-04-29

## 2025-04-29 NOTE — TELEPHONE ENCOUNTER
See telephone encounter 3-20-25  pls advise, thanks in advance.       Refill Passed Per Protocol    Requested Prescriptions   Pending Prescriptions Disp Refills    ESOMEPRAZOLE MAGNESIUM 40 MG Oral Capsule Delayed Release [Pharmacy Med Name: ESOMEPRAZOLE MAGNESIUM 40MG DR CAPS] 30 capsule 0     Sig: TAKE 1 CAPSULE(40 MG) BY MOUTH DAILY       Gastrointestional Medication Protocol Passed - 4/28/2025 11:25 PM        Passed - In person appointment or virtual visit in the past 12 mos or appointment in next 3 mos     Recent Outpatient Visits              1 month ago Stage 3 chronic kidney disease, unspecified whether stage 3a or 3b CKD ([facility])    [facility]    Office Visit    2 months ago Acute bronchiolitis due to unspecified organism    [facility], Pepe Granado MD    Virtual Phone E/M    4 months ago Stage 3 chronic kidney disease, unspecified whether stage 3a or 3b CKD ([facility])    [facility]    Office Visit    4 months ago Arthropathy of cervical facet joint    [facility] Beni Kirk DO    Telemedicine    4 months ago Acute non-recurrent sinusitis of other sinus    [facility] [address], [place] Josefina Santiago MD    Telemedicine          Future Appointments         Provider Department Appt Notes    In 1 month Hermelinda Bhatia MD [facility], [address], [place] Lung nodule    In 1 month Fam Cerna MD [facility], [address], [place] I would like to have an appt. with Dr. Myers sooner than June 25.  Please call me@ [phone]    In 4 months Mikaela Arriola MD [facility] 5-6 month                    Passed - Medication is active on med list             Future Appointments         Provider Department  Appt Notes    In 1 month Hermelinda Bhatia MD [facility], [address], [place] Lung nodule    In 1 month Fam Cerna MD [facility], [address], [place] I would like to have an appt. with Dr. Myers sooner than June 25.  Please call me@ [phone]    In 4 months Mikaela Arriola MD [facility] 5-6 month          Recent Outpatient Visits              1 month ago Stage 3 chronic kidney disease, unspecified whether stage 3a or 3b CKD (HCC)    [facility] [place] Mikaela Arriola MD    Office Visit    2 months ago Acute bronchiolitis due to unspecified organism    [facility], Pepe Granado MD    Virtual Phone E/M    4 months ago Stage 3 chronic kidney disease, unspecified whether stage 3a or 3b CKD (HCC)    [facility], Mikaela Esparza MD    Office Visit    4 months ago Arthropathy of cervical facet joint    [facility] Beni Kirk DO    Telemedicine    4 months ago Acute non-recurrent sinusitis of other sinus    [facility], [address], [place] Josefina Santiago MD    Telemedicine

## 2025-05-19 ENCOUNTER — OFFICE VISIT (OUTPATIENT)
Dept: INTERNAL MEDICINE CLINIC | Facility: CLINIC | Age: 65
End: 2025-05-19

## 2025-05-19 VITALS
HEIGHT: 62 IN | DIASTOLIC BLOOD PRESSURE: 74 MMHG | BODY MASS INDEX: 39.56 KG/M2 | SYSTOLIC BLOOD PRESSURE: 113 MMHG | HEART RATE: 94 BPM | WEIGHT: 215 LBS

## 2025-05-19 DIAGNOSIS — R92.1 BREAST CALCIFICATION, LEFT: Primary | ICD-10-CM

## 2025-05-19 DIAGNOSIS — N64.4 BREAST PAIN: ICD-10-CM

## 2025-05-19 PROCEDURE — 99213 OFFICE O/P EST LOW 20 MIN: CPT | Performed by: INTERNAL MEDICINE

## 2025-05-19 NOTE — PROGRESS NOTES
Elin Walker is a 65 year old female.  Chief Complaint   Patient presents with    Follow - Up     HPI:    Patient presented today for follow up. She states that she has had long standing cysts in her breast which had to be removed. She has been having constant pain in her back and also states that the breasts are tender to touch. She is not able to carry on her day to day activities at work because of pain associated with arm movements in the breasts. She would like to have mastectomy done because of this limitation with her ADLs. Breasts also tender to touch. Would like to be evaluated by a breast surgeon.       Current Medications[1]   Past Medical History[2]   Past Surgical History[3]   Social History:  Short Social Hx on File[4]   Family History[5]   Allergies[6]     REVIEW OF SYSTEMS:   Review of Systems   Review of Systems   Constitutional: Negative for activity change, appetite change and fever.   HENT: Negative for congestion and voice change.    Respiratory: Negative for cough and shortness of breath.    Cardiovascular: Negative for chest pain.   Gastrointestinal: Negative for abdominal distention, abdominal pain and vomiting.   Genitourinary: Negative for hematuria.   Skin: Negative for wound.   Psychiatric/Behavioral: Negative for behavioral problems.   Wt Readings from Last 5 Encounters:   05/19/25 215 lb (97.5 kg)   03/18/25 213 lb (96.6 kg)   12/16/24 205 lb (93 kg)   11/06/24 202 lb (91.6 kg)   07/16/24 192 lb (87.1 kg)     Body mass index is 39.32 kg/m².      EXAM:   /74   Pulse 94   Ht 5' 2\" (1.575 m)   Wt 215 lb (97.5 kg)   BMI 39.32 kg/m²   Physical Exam   Constitutional:       Appearance: Normal appearance.   HENT:      Head: Normocephalic.   Eyes:      Conjunctiva/sclera: Conjunctivae normal.   Breast:  Normal bilateral breast exam. No palpable masses or nodules.   No nipples asymmetry or discharge. No skin changes   Cardiovascular:      Rate and Rhythm: Normal rate and regular  rhythm.      Heart sounds: Normal heart sounds. No murmur heard.  Pulmonary:      Effort: Pulmonary effort is normal.      Breath sounds: Normal breath sounds. No rhonchi or rales.   Abdominal:      General: Bowel sounds are normal.      Palpations: Abdomen is soft.      Tenderness: There is no abdominal tenderness.   Musculoskeletal:      Cervical back: Neck supple.      Right lower leg: No edema.      Left lower leg: No edema.   Skin:     General: Skin is warm and dry.   Neurological:      General: No focal deficit present.      Mental Status: He is alert and oriented to person, place, and time. Mental status is at baseline.   Psychiatric:         Mood and Affect: Mood normal.         Behavior: Behavior normal.       ASSESSMENT AND PLAN:   Assessment & Plan     Elin was seen today for follow - up.    Diagnoses and all orders for this visit:    Breast calcification, left  -     Surgery Referral - In Network  - most recent mammogram reviewed. Had left breast calcification which was biopsied and was benign  - patient wants to be evaluated for mastectomy     Breast pain  -     Surgery Referral - In Network           The patient indicates understanding of these issues and agrees to the plan.  Follow up with pcp     Gabriela Vazquez MD     This note was created by Dragon voice recognition. Errors in content may be related to improper recognition by the system; efforts to review and correct have been done but errors may still exist. Please be advised the primary purpose of this note is for me to communicate medical care. Standard sentence structure is not always used. Medical terminology and medical abbreviations may be used. There may be grammatical, typographical, and automated fill ins that may have errors missed in proofreading.          [1]   Current Outpatient Medications   Medication Sig Dispense Refill    Esomeprazole Magnesium 40 MG Oral Capsule Delayed Release Take 1 capsule (40 mg total) by mouth daily.  90 capsule 0    SUMAtriptan 50 MG Oral Tab TAKE 1 TABLET BY MOUTH WITH MIGRAINE HEADACHE AS NEEDED BUT NO MORE THAN 2 TABLETS TABLETS PER WEEK 9 tablet 1    benztropine 1 MG Oral Tab Take 1 tablet (1 mg total) by mouth 2 (two) times daily.      gabapentin 300 MG Oral Cap Take 1 capsule (300 mg total) by mouth 3 (three) times daily.      OXcarbazepine 150 MG Oral Tab Take 2 tablets (300 mg total) by mouth 2 (two) times daily.      atorvastatin 20 MG Oral Tab Take 1 tablet (20 mg total) by mouth daily.      levothyroxine 150 MCG Oral Tab Take 1 tablet (150 mcg total) by mouth before breakfast. DISCONTINUE LEVOTHYROXINE 200 MCG DAILY 90 tablet 3    diazePAM 10 MG Oral Tab Take 1 tablet (10 mg total) by mouth daily as needed.      traZODone 150 MG Oral Tab Take 2 tablets (300 mg total) by mouth every evening.      Elastic Bandages & Supports (POSTURE SUPPORT/ELASTIC XL) Does not apply Misc Wear during the day as tolerates 1 each 0    albuterol 108 (90 Base) MCG/ACT Inhalation Aero Soln Inhale 2 puffs into the lungs every 4 (four) hours as needed for Wheezing or Shortness of Breath. 18 g 5    benzonatate 200 MG Oral Cap Take 1 capsule (200 mg total) by mouth 3 (three) times daily as needed for cough. 30 capsule 0    Albuterol Sulfate  (90 Base) MCG/ACT Inhalation Aero Soln Inhale 2 puffs into the lungs every 4 to 6 hours as needed.      SYMBICORT 160-4.5 MCG/ACT Inhalation Aerosol Inhale 2 puffs into the lungs 2 (two) times daily.      D3-50 1.25 MG (64467 UT) Oral Cap TAKE ONE CAPSULE BY MOUTH ONCE A WEEK WITH MEALS      FLUoxetine 20 MG Oral Cap Take 1 capsule (20 mg total) by mouth 2 (two) times daily.      zolpidem 10 MG Oral Tab Take 2 tablets (20 mg total) by mouth nightly as needed. Patient take 1 table prn      lidocaine 5 % External Patch Place 1 patch onto the skin daily. (Patient not taking: Reported on 5/19/2025) 7 patch 0   [2] History reviewed. No pertinent past medical history.  [3]   Past Surgical  History:  Procedure Laterality Date    Kristine biopsy stereo nodule 1 site left (cpt=19081)  07/24/2024    x clip, calcs   [4]   Social History  Socioeconomic History    Marital status: Single   Tobacco Use    Smoking status: Some Days     Types: Cigarettes    Tobacco comments:     On and off   Vaping Use    Vaping status: Never Used   Substance and Sexual Activity    Alcohol use: Never    Drug use: Never   Other Topics Concern    Grew up on a farm No    History of tanning Yes    Outdoor occupation No    Breast feeding No    Reaction to local anesthetic No    Pt has a pacemaker No    Pt has a defibrillator No     Social Drivers of Health     Food Insecurity: Not At Risk (5/7/2023)    Received from [facility]    Food Insecurity     RETIRE How often in the past 12 months would you say you are worried or stressed about having enough money to buy nutritious meals? : Never   Transportation Needs: No Transportation Needs (5/7/2023)    Received from [facility]    PRAPARE - Transportation     In the past 12 months, has lack of transportation kept you from medical appointments or from getting medications?: No     In the past 12 months, has lack of transportation kept you from meetings, work, or from getting things needed for daily living?: No   [5]   Family History  Problem Relation Age of Onset    Other (Other) Father         Renal Failure    Other (Other) Mother         Grave's Disease    Cancer Mother         lung    Cancer Maternal Grandmother         Stomach    Other (Other) Maternal Grandfather         Heart Failure    Ovarian Cancer Paternal Grandmother         AGE UNKNOWN    Cancer Paternal Grandmother     Skin cancer Sister     Cancer Brother         Lung cancer    Infectious Disease Other    [6]   Allergies  Allergen Reactions    Dust Mite Extract UNKNOWN     Seasonal    Penicillin G OTHER (SEE COMMENTS)     itchiness

## 2025-05-21 ENCOUNTER — OFFICE VISIT (OUTPATIENT)
Dept: SURGERY | Facility: CLINIC | Age: 65
End: 2025-05-21
Payer: MEDICARE

## 2025-05-21 VITALS
BODY MASS INDEX: 42.01 KG/M2 | HEART RATE: 92 BPM | HEIGHT: 60 IN | DIASTOLIC BLOOD PRESSURE: 60 MMHG | SYSTOLIC BLOOD PRESSURE: 98 MMHG | OXYGEN SATURATION: 92 % | WEIGHT: 214 LBS | RESPIRATION RATE: 16 BRPM

## 2025-05-21 DIAGNOSIS — E66.01 MORBID OBESITY WITH BMI OF 40.0-44.9, ADULT (HCC): ICD-10-CM

## 2025-05-21 DIAGNOSIS — M15.0 PRIMARY OSTEOARTHRITIS INVOLVING MULTIPLE JOINTS: ICD-10-CM

## 2025-05-21 DIAGNOSIS — F43.9 STRESS: ICD-10-CM

## 2025-05-21 DIAGNOSIS — E78.5 DYSLIPIDEMIA: Primary | ICD-10-CM

## 2025-05-21 PROCEDURE — 99205 OFFICE O/P NEW HI 60 MIN: CPT | Performed by: INTERNAL MEDICINE

## 2025-05-21 RX ORDER — TOPIRAMATE 25 MG/1
25 TABLET, FILM COATED ORAL EVERY EVENING
Qty: 30 TABLET | Refills: 5 | Status: SHIPPED | OUTPATIENT
Start: 2025-05-21

## 2025-05-21 NOTE — PROGRESS NOTES
[facility] Consultation Note       Patient:  Elin Walker  :      1960  MRN:      [id number]    Referring Provider: Dr. Arriola       Chief Complaint:    Chief Complaint   Patient presents with    Consult    Weight Management       SUBJECTIVE     History of Present Illness:  Elin Walker has been referred to me for evaluation and treatment.       64 yo who lives alone  Does her own cooking    Past Medical History: Past Medical History[1]    OBJECTIVE     Vitals: BP 98/60   Pulse 92   Resp 16   Ht 5' (1.524 m)   Wt 214 lb (97.1 kg)   SpO2 92%   BMI 41.79 kg/m²      Patient Medications:  Current Medications[2]    Allergies:  Dust mite extract and Penicillin g     Comorbidities:  osteoarthritis and dyslipemia     Social History:    Social History     Socioeconomic History    Marital status: Single     Spouse name: Not on file    Number of children: Not on file    Years of education: Not on file    Highest education level: Not on file   Occupational History    Not on file   Tobacco Use    Smoking status: Some Days     Types: Cigarettes    Smokeless tobacco: Not on file    Tobacco comments:     On and off   Vaping Use    Vaping status: Never Used   Substance and Sexual Activity    Alcohol use: Never    Drug use: Never    Sexual activity: Not on file   Other Topics Concern    Grew up on a farm No    History of tanning Yes    Outdoor occupation No    Breast feeding No    Reaction to local anesthetic No    Pt has a pacemaker No    Pt has a defibrillator No   Social History Narrative    Not on file     Social Drivers of Health     Food Insecurity: Not At Risk (2023)    Received from [facility]    Food Insecurity     RETIRE How often in the past 12 months would you say you are worried or stressed about having enough money to buy nutritious meals? : Never   Transportation Needs: No Transportation Needs (2023)    Received from [facility]    PRAPARE -  Transportation     In the past 12 months, has lack of transportation kept you from medical appointments or from getting medications?: No     In the past 12 months, has lack of transportation kept you from meetings, work, or from getting things needed for daily living?: No   Stress: Not on file   Housing Stability: Not on file     Surgical History:  Past Surgical History[3]    Family History:  Family History[4]        Typical Dietary Intake:  Breakfast AM Snack Lunch PM Snack Dinner   soda soda Salad, soda Fruit, chocolate milk Soup, pasta, veggie   +sweet tooth  +sleep walker      Soda Drinker?: Yes  If yes, how much?:  diet 4/day    Number of restaurant or fast food meals/week:   meals/week    Nutritional Goals Reviewed and Discussed:     Limit carbohydrates to 100 gms per day, Eat 100-200 calories within 1 hour of waking up, and Eat 3-4 cups of fresh fruit or vegetables daily    Behavior Modifications Reviewed and Discussed:    Eat breakfast, Eat 3 meals per day, Plan meals in advance, Read nutrition labels, Drink 64oz of water per day, Maintain a daily food journal, No drinking 30 minutes before or after meals, Utilize portion control strategies to reduce calorie intake, Identify triggers for eating and manage cues, and Eat slowly and take 20 to 30 minutes to complete each meal      ROS:  Constitutional: negative  Respiratory: negative  Cardiovascular: negative  Gastrointestinal: positive for reflux symptoms  Musculoskeletal:positive for arthralgias and back pain  Neurological: positive for headaches  Behavioral/Psych: positive for stress  Endocrine: negative  All other systems were reviewed and are negative.    Physical Exam:  General appearance: alert, appears stated age, cooperative, and moderately obese  Head: Normocephalic, without obvious abnormality, atraumatic  Back: symmetric, no curvature. ROM normal. No CVA tenderness.  Lungs: clear to auscultation bilaterally  Heart: S1, S2 normal, no murmur, click,  rub or gallop, regular rate and rhythm  Abdomen: soft, obese, non tender  Extremities: extremities normal, atraumatic, no cyanosis or edema  Pulses: 2+ and symmetric  Skin: skin changes under skin folds  Neurologic: Grossly normal  Pannulectomy scar noted    ASSESSMENT     HYPERCHOLESTEROLEMIA:  The patient states that her cholesterol has been well controlled on her current medication.    Lab Results   Component Value Date/Time    CHOLEST 221 (H) 07/16/2024 01:26 PM     (H) 07/16/2024 01:26 PM    HDL 48 07/16/2024 01:26 PM    TRIG 189 (H) 07/16/2024 01:26 PM    VLDL 35 (H) 07/16/2024 01:26 PM         Encounter Diagnosis(ses):   1. Dyslipidemia    2. Stress    3. Primary osteoarthritis involving multiple joints    4. Morbid obesity with BMI of 40.0-44.9, adult (HCC)        PLAN     Patient is not interested in bariatric surgery. Patient desires to pursue traditional weight loss at this time.      DYSLIPIDEMIA: Stable on the above prescribed meal plan and medication. Liver function stable.    Lab Results   Component Value Date/Time    CHOLEST 221 (H) 07/16/2024 01:26 PM     (H) 07/16/2024 01:26 PM    HDL 48 07/16/2024 01:26 PM    TRIG 189 (H) 07/16/2024 01:26 PM    VLDL 35 (H) 07/16/2024 01:26 PM       DEGENERATIVE JOINT DISEASE: Of the knees is stable. Encourage upper body exercises if unable to perform weight-bearing exercises.      OBSTRUCTIVE SLEEP APNEA: Given the patient's history suggestive of obstructive sleep apnea as outlined above, consideration for obtaining a sleep study may be warranted.  Further consideration for obtaining the sleep study will be discussed with the patient's PCP.      Goals for next month:  1. Keep a food log.  2. Drink 48-64 ounces of non-caloric beverages per day. No fruit juices or regular soda.  3. Increase activity-upper body exercises, walk 10 minutes per day.  4. Increase fruit and vegetable servings to 5-6 per day.      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  [2]   Current Outpatient Medications   Medication Sig Dispense Refill    Esomeprazole Magnesium 40 MG Oral Capsule Delayed Release Take 1 capsule (40 mg total) by mouth daily. 90 capsule 0    SUMAtriptan 50 MG Oral Tab TAKE 1 TABLET BY MOUTH WITH MIGRAINE HEADACHE AS NEEDED BUT NO MORE THAN 2 TABLETS TABLETS PER WEEK 9 tablet 1    benztropine 1 MG Oral Tab Take 1 tablet (1 mg total) by mouth 2 (two) times daily.      gabapentin 300 MG Oral Cap Take 1 capsule (300 mg total) by mouth 3 (three) times daily.      OXcarbazepine 150 MG Oral Tab Take 2 tablets (300 mg total) by mouth 2 (two) times daily.      atorvastatin 20 MG Oral Tab Take 1 tablet (20 mg total) by mouth daily.      lidocaine 5 % External Patch Place 1 patch onto the skin daily. (Patient not taking: Reported on 5/19/2025) 7 patch 0    levothyroxine 150 MCG Oral Tab Take 1 tablet (150 mcg total) by mouth before breakfast. DISCONTINUE LEVOTHYROXINE 200 MCG DAILY 90 tablet 3    diazePAM 10 MG Oral Tab Take 1 tablet (10 mg total) by mouth daily as needed.      traZODone 150 MG Oral Tab Take 2 tablets (300 mg total) by mouth every evening.      Elastic Bandages & Supports (POSTURE SUPPORT/ELASTIC XL) Does not apply Misc Wear during the day as tolerates 1 each 0    albuterol 108 (90 Base) MCG/ACT Inhalation Aero Soln Inhale 2 puffs into the lungs every 4 (four) hours as needed for Wheezing or Shortness of Breath. 18 g 5    benzonatate 200 MG Oral  Cap Take 1 capsule (200 mg total) by mouth 3 (three) times daily as needed for cough. 30 capsule 0    Albuterol Sulfate  (90 Base) MCG/ACT Inhalation Aero Soln Inhale 2 puffs into the lungs every 4 to 6 hours as needed.      SYMBICORT 160-4.5 MCG/ACT Inhalation Aerosol Inhale 2 puffs into the lungs 2 (two) times daily.      D3-50 1.25 MG (07191 UT) Oral Cap TAKE ONE CAPSULE BY MOUTH ONCE A WEEK WITH MEALS      FLUoxetine 20 MG Oral Cap Take 1 capsule (20 mg total) by mouth 2 (two) times daily.      zolpidem 10 MG Oral Tab Take 2 tablets (20 mg total) by mouth nightly as needed. Patient take 1 table prn     [3]   Past Surgical History:  Procedure Laterality Date    Kristine biopsy stereo nodule 1 site left (cpt=19081)  07/24/2024    x clip, calcs   [4]   Family History  Problem Relation Age of Onset    Other (Other) Father         Renal Failure    Other (Other) Mother         Grave's Disease    Cancer Mother         lung    Cancer Maternal Grandmother         Stomach    Other (Other) Maternal Grandfather         Heart Failure    Ovarian Cancer Paternal Grandmother         AGE UNKNOWN    Cancer Paternal Grandmother     Skin cancer Sister     Cancer Brother         Lung cancer    Infectious Disease Other

## 2025-05-30 ENCOUNTER — OFFICE VISIT (OUTPATIENT)
Dept: PULMONOLOGY | Facility: CLINIC | Age: 65
End: 2025-05-30
Payer: MEDICARE

## 2025-05-30 VITALS
BODY MASS INDEX: 42.01 KG/M2 | WEIGHT: 214 LBS | SYSTOLIC BLOOD PRESSURE: 110 MMHG | HEIGHT: 60 IN | OXYGEN SATURATION: 95 % | DIASTOLIC BLOOD PRESSURE: 66 MMHG | HEART RATE: 72 BPM

## 2025-05-30 DIAGNOSIS — J43.8 OTHER EMPHYSEMA (HCC): ICD-10-CM

## 2025-05-30 DIAGNOSIS — J44.1 CHRONIC OBSTRUCTIVE PULMONARY DISEASE WITH ACUTE EXACERBATION (HCC): ICD-10-CM

## 2025-05-30 DIAGNOSIS — R91.8 LUNG NODULES: Primary | ICD-10-CM

## 2025-05-30 PROCEDURE — 99204 OFFICE O/P NEW MOD 45 MIN: CPT | Performed by: INTERNAL MEDICINE

## 2025-05-30 RX ORDER — ALBUTEROL SULFATE 90 UG/1
2 INHALANT RESPIRATORY (INHALATION) EVERY 4 HOURS PRN
Qty: 18 G | Refills: 5 | Status: SHIPPED | OUTPATIENT
Start: 2025-05-30

## 2025-05-30 RX ORDER — BUDESONIDE AND FORMOTEROL FUMARATE DIHYDRATE 160; 4.5 UG/1; UG/1
2 AEROSOL RESPIRATORY (INHALATION) 2 TIMES DAILY
Qty: 1 EACH | Refills: 11 | Status: SHIPPED | OUTPATIENT
Start: 2025-05-30

## 2025-05-30 RX ORDER — PREDNISONE 10 MG/1
TABLET ORAL
Qty: 18 TABLET | Refills: 0 | Status: SHIPPED | OUTPATIENT
Start: 2025-05-30

## 2025-05-30 RX ORDER — AZITHROMYCIN 250 MG/1
TABLET, FILM COATED ORAL
Qty: 6 TABLET | Refills: 0 | Status: SHIPPED | OUTPATIENT
Start: 2025-05-30

## 2025-05-30 RX ORDER — IPRATROPIUM BROMIDE AND ALBUTEROL SULFATE 2.5; .5 MG/3ML; MG/3ML
3 SOLUTION RESPIRATORY (INHALATION) 2 TIMES DAILY
Qty: 180 ML | Refills: 2 | Status: SHIPPED | OUTPATIENT
Start: 2025-05-30

## 2025-05-30 NOTE — PROGRESS NOTES
Nebulizer order, and chart notes [facility] to [facility]

## 2025-05-30 NOTE — H&P
[facility], [address], [place]    Pulmonary consult     Elin Walker Patient Status:  Specimen    1960 MRN [id number]   Location [facility], [address], [place] Attending No att. providers found   Hosp Day # 0 PCP Fam Cerna MD     Date:  2025    History provided by:patient  HPI:     Chief Complaint   Patient presents with    Consult     Has chronic pulmonary issues. Horrible coughing, sinus, chest hurts, shortness of breath after coughing.      HPI    65-year-old female with history of bipolar affective disorder, CKD, dyslipidemia, obesity, smoker since age 15 years old with likely underlying COPD.    Patient presented with dyspnea more upon exertion which is a chronic and cough and wheezes worse since she had COVID-19 6 months ago and worse in the last few weeks with light yellow sputum with no fever or significant cough and generalized body ache and dyspnea upon exertion  Dry mouth with no significant neck pain or sore throat  Denied abdominal pain or vomiting or diarrhea  No lower extremity edema or pain or calf tenderness    History   Past Medical History[1]  Past Surgical History[2]  Family History[3]  Social History:  Short Social Hx on File[4]  Allergies/Medications:   Allergies: Allergies[5]  Prescriptions Prior to Admission[6]    Review of Systems:     Constitutional:  Positive for fatigue. Negative for fever.   HENT:  Negative for congestion.    Respiratory:  Positive for cough, shortness of breath and wheezing.    Cardiovascular:  Negative for chest pain and leg swelling.   Gastrointestinal:  Negative for abdominal distention.   Musculoskeletal:  Positive for back pain.   Skin: Negative.    Neurological:  Negative for seizures.   Psychiatric/Behavioral:  Negative for agitation.        Physical Exam:   Vital Signs:  Blood pressure 110/66, pulse 72, height 5' (1.524 m), weight 214 lb (97.1 kg), SpO2 95%.  Physical Exam  Constitutional:        General: She is not in acute distress.     Appearance: She is obese.   HENT:      Head: Atraumatic.      Nose: Nose normal.      Mouth/Throat:      Mouth: Mucous membranes are dry.   Eyes:      General: No scleral icterus.  Cardiovascular:      Rate and Rhythm: Normal rate.      Heart sounds: No murmur heard.     No gallop.   Pulmonary:      Comments: Good air exchange to both lungs  Mild expiratory wheezes  Prolonged expiration  No rales or rhonchi  Abdominal:      General: Abdomen is flat. Bowel sounds are normal. There is no distension.      Palpations: Abdomen is soft.      Tenderness: There is no guarding.   Musculoskeletal:      Right lower leg: No edema.      Left lower leg: No edema.   Skin:     General: Skin is dry.   Neurological:      General: No focal deficit present.      Mental Status: She is oriented to person, place, and time.         Results:     Lab Results   Component Value Date    WBC 8.7 01/21/2025    HGB 14.3 01/21/2025    HCT 41.8 01/21/2025    .0 01/21/2025    CREATSERUM 1.37 (H) 01/21/2025    BUN 20 01/21/2025     01/21/2025    K 4.7 01/21/2025     01/21/2025    CO2 25.0 01/21/2025     (H) 01/21/2025    CA 9.9 01/21/2025    ALB 4.4 01/21/2025    ALKPHO 125 04/10/2024    BILT 0.3 04/10/2024    TP 7.3 04/10/2024    AST 19 04/10/2024    ALT 10 04/10/2024    T4F 1.7 04/10/2024    TSH 1.366 07/16/2024    ESRML 43 (H) 07/16/2024    CRP <0.40 07/16/2024    MG 2.0 12/30/2011    PHOS 4.8 01/21/2025    CK 34 04/10/2024    B12 1,532 (H) 04/10/2024    ETOH < 3 12/29/2011     Chest ct 11/30/2024 reviewed :    Impression   CONCLUSION:     1.1 cm right upper lobe of pulmonary nodule.  Primary lung malignancy not excluded.  PET-CT suggested for further evaluation.     0.8 cm nodule within the left lower lobe may be sequela of scar or atelectasis. Followup chest CT recommended in 6 months to demonstrate resolution.       Mosaic attenuation of the lungs bilaterally suggestive of air  trapping which can be seen with small vessel or small airways disease.     Bronchial wall thickening suggestive of mild chronic airway inflammation.       Mild centrilobular emphysema      PET scan jan /7/2025 reviewed :    Narrative   PROCEDURE: PET/CT STANDARD BODY SCAN (CPT=78815)     COMPARISON: [facility] [place] [facility], US THYROID (CPT= 87201), 11/30/2024, 9:44 AM.  [facility], CT CHEST HI RESOLUTION (CPT=71250), 11/30/2024, 8:10 AM.     INDICATIONS: R91.1 Nodule of right lung J43.8 Other emphysema (HCC) F17.200 Tobacco dependence R91.1 Lung nodule R93.89 Abnormal CT of the chest     TECHNIQUE: PET/CT study of the head to thighs, done with 14.3 millicuries of Fluorine-18 FDG (Fluorodeoxyglucose) injected into a right antecubital vein.  Low dose non-contrast CT scanning was performed using a dedicated integrated PET/CT scanner for  attenuation correction and anatomic localization only.     FASTING PATIENT BLOOD GLUCOSE: 130 mg/dL.    UPTAKE TIME: 92 minutes.       FINDINGS:  Mediastinal blood pool is 3.13 max SUV, and hepatic blood pool is 3.70 max SUV.     HEAD/NECK: Diffusely increased uptake within the thyroid gland.  LUNGS: No pathologic FDG activity.  The right upper lobe nodule of concern does not exhibit significant metabolic activity.  The left lower lobe stable pulmonary nodule does not exhibit significant metabolic activity.  MEDIASTINUM/NUNO: No pathologic FDG activity.  CHEST WALL/AXILLA: No pathologic FDG activity.  ABDOMEN/PELVIS: No pathologic FDG activity.  MUSCULOSKELETAL: No pathologic FDG activity.     OTHER CT FINDINGS:   Dental fillings are present.  Coronary artery calcifications are seen.  The gallbladder is surgically absent.  Spondylosis and degenerative joint disease.  The urinary bladder is under distended and poorly evaluated.               Impression   CONCLUSION:     1. No disease specific pathologic FDG activity is identified.      2. The nodule of concern in the right upper lobe does not exhibit significant metabolic activity.  Recommend follow-up CT chest in 6 months to assess for stability.  Previously demonstrated stable nodule in the left lower lobe also does not exhibit  metabolic activity.     3. Diffusely increased uptake is noted throughout the thyroid gland.  This correlates with recent thyroid ultrasound performed on 11/30/2024 demonstrating thyroiditis but no discrete nodule.     4. Additional chronic or incidental findings are described in the body of this report.         Assessment/Plan:      1- COPD with acute exacerbation   Lifelong history of smoking since age 15 years old    Plan :  [facility]  Prednisone taper course  Home nebulizer machine and DuoNeb twice daily and as needed  Continue with ICS/LABA with Symbicort 2 puffs twice a day  Albuterol as needed  If not better or worse to go to ER  Complete smoking cessation /counseling provided /quit line info given to the base  PFTs    2-lung nodules seen on chest ct 11/30/2024   1.1 cm RUL and 8 mm LLL   Negative on PET scan on 1/7/2025   F/u chest ct in 6 months interval       F/u in 2-3 months   If not better or worse to go to              Hermelinda Bhatia MD  5/30/2025         [1]   Past Medical History:   Bipolar affective (HCC)    CKD (chronic kidney disease)    Dyslipidemia    Morbid obesity with BMI of 40.0-44.9, adult (HCC)   [2]   Past Surgical History:  Procedure Laterality Date    Kristine biopsy stereo nodule 1 site left (cpt=19081)  07/24/2024    x clip, calcs   [3]   Family History  Problem Relation Age of Onset    Other (Other) Father         Renal Failure    Other (Other) Mother         Grave's Disease    Cancer Mother         lung    Cancer Maternal Grandmother         Stomach    Other (Other) Maternal Grandfather         Heart Failure    Ovarian Cancer Paternal Grandmother         AGE UNKNOWN    Cancer Paternal Grandmother     Skin cancer Sister     Cancer Brother          Lung cancer    Infectious Disease Other    [4]   Social History  Socioeconomic History    Marital status: Single   Tobacco Use    Smoking status: Former     Types: Cigarettes     Start date:      Quit date:      Years since quittin.4    Tobacco comments:     On and off   Vaping Use    Vaping status: Never Used   Substance and Sexual Activity    Alcohol use: Never    Drug use: Never   Other Topics Concern    Grew up on a farm No    History of tanning Yes    Outdoor occupation No    Breast feeding No    Reaction to local anesthetic No    Pt has a pacemaker No    Pt has a defibrillator No     Social Drivers of Health     Food Insecurity: Not At Risk (2023)    Received from [facility]    Food Insecurity     RETIRE How often in the past 12 months would you say you are worried or stressed about having enough money to buy nutritious meals? : Never   Transportation Needs: No Transportation Needs (2023)    Received from [facility]    PRAPARE - Transportation     In the past 12 months, has lack of transportation kept you from medical appointments or from getting medications?: No     In the past 12 months, has lack of transportation kept you from meetings, work, or from getting things needed for daily living?: No   [5]   Allergies  Allergen Reactions    Dust Mite Extract UNKNOWN     Seasonal    Penicillin G OTHER (SEE COMMENTS)     itchiness   [6] (Not in a hospital admission)

## 2025-06-04 ENCOUNTER — TELEPHONE (OUTPATIENT)
Dept: PULMONOLOGY | Facility: CLINIC | Age: 65
End: 2025-06-04

## 2025-06-04 NOTE — TELEPHONE ENCOUNTER
PRIOR AUTH:   SYMBICORT 160-4.5 MCG/ACT Inhalation Aerosol Inhale 2 puffs into the lungs 2 (two) times daily. 1 each 11

## 2025-06-06 RX ORDER — FLUTICASONE PROPIONATE AND SALMETEROL 250; 50 UG/1; UG/1
1 POWDER RESPIRATORY (INHALATION) 2 TIMES DAILY
Qty: 1 EACH | Refills: 5 | Status: SHIPPED | OUTPATIENT
Start: 2025-06-06

## 2025-06-06 NOTE — TELEPHONE ENCOUNTER
Per pharmacy Symbicort MICHELA not covered. Advair HFA, Breo Ellipta, generic Symbicort or Breyna may be covered alternatives.     Dr Bhatia- please advise on alternative

## 2025-06-09 RX ORDER — BUDESONIDE AND FORMOTEROL FUMARATE DIHYDRATE 160; 4.5 UG/1; UG/1
2 AEROSOL RESPIRATORY (INHALATION) 2 TIMES DAILY
Qty: 3 EACH | Refills: 1 | Status: SHIPPED | OUTPATIENT
Start: 2025-06-09

## 2025-06-09 NOTE — TELEPHONE ENCOUNTER
Per pharmacy Advair diskus brand and generic not covered. Generic Symbicort/Breyna covered.    Dr Bhatia- please sign pended order if agreeable

## 2025-06-11 ENCOUNTER — TELEPHONE (OUTPATIENT)
Dept: PULMONOLOGY | Facility: CLINIC | Age: 65
End: 2025-06-11

## 2025-06-11 NOTE — TELEPHONE ENCOUNTER
Medication Quantity Refills Start End   fluticasone-salmeterol 250-50 MCG/ACT Inhalation Aerosol Powder, Breath Activated 1 each 5 6/6/2025 --   Sig:   Inhale 1 puff into the lungs 2 (two) times daily. inhale 1 puff by INHALATION route 2 times every day morning and evening approximately 12 hours apart     Route:   Inhalation         DRUG CHANGE REQUEST    Plan does not cover medication prescribed. Per Rx benefit, plan alternative medications include: ADVAIR HFA. Please fax back with approval along with strength, quantity, and refills.

## 2025-06-13 NOTE — TELEPHONE ENCOUNTER
Spoke to pharmacy:  Breyna would cost $138.00 for 90-days. Ready for pick-up.    VM left for patient.

## 2025-06-19 NOTE — TELEPHONE ENCOUNTER
Patient contacts clinic regarding gabapentin refill.  Previously prescribed by another physician that she no longer sees. Inquires if Dr. Cerna will take over prescribing.   Pended for signature. If appropriate.

## 2025-06-21 RX ORDER — GABAPENTIN 300 MG/1
300 CAPSULE ORAL 3 TIMES DAILY
Qty: 270 CAPSULE | Refills: 3 | OUTPATIENT
Start: 2025-06-21

## 2025-06-23 NOTE — TELEPHONE ENCOUNTER
Called the patient and let her know of Dr Cerna declining the prescription. Advised she speak with her tomorrow regarding getting it filled.

## 2025-07-07 RX ORDER — LEVOTHYROXINE SODIUM 200 UG/1
200 TABLET ORAL DAILY
Qty: 90 TABLET | Refills: 3 | Status: SHIPPED | OUTPATIENT
Start: 2025-07-07

## 2025-07-30 ENCOUNTER — TELEPHONE (OUTPATIENT)
Dept: PULMONOLOGY | Facility: CLINIC | Age: 65
End: 2025-07-30

## 2025-08-04 RX ORDER — SUMATRIPTAN 50 MG/1
TABLET, FILM COATED ORAL
Qty: 9 TABLET | Refills: 5 | Status: SHIPPED | OUTPATIENT
Start: 2025-08-04

## 2025-08-08 RX ORDER — GABAPENTIN 300 MG/1
300 CAPSULE ORAL 3 TIMES DAILY
Refills: 0 | OUTPATIENT
Start: 2025-08-08

## 2025-08-11 RX ORDER — GABAPENTIN 300 MG/1
300 CAPSULE ORAL 3 TIMES DAILY
Qty: 180 CAPSULE | Refills: 0 | OUTPATIENT
Start: 2025-08-11

## (undated) NOTE — Clinical Note
Thanks for the referral  She should speak to pulmonary team about a sleep study  Will start Topiramate 25 mg She drinks a lot of soda daily  Golden

## (undated) NOTE — LETTER
[facility]  [address] [facility] [address]    Authorization for Imaging Procedure      I hereby authorize Dr. LAWRENCE , my physician and his/her assistants (if applicable), which may include medical students, residents, and/or fellows, to perform the following procedure and administer such anesthesia as may be determined necessary by my physician: STEREOTACTIC GUIDED WITH TOMOSYNTHESIS BIOPSY OF THE LEFT BREAST WITH CLIP PLACEMENT on Elin Walker.   2.  I recognize that during the procedure, unforeseen conditions may necessitate additional or different procedures than those listed above. I, therefore, further authorize and request that the above-named physician, assistants, or designees perform such procedures as are, in their judgment, necessary and desirable.    3.  My physician has discussed prior to my procedure the potential benefits, risks and side effects of this procedure; the likelihood of achieving goals; and potential problems that might occur during recuperation. They also discussed reasonable alternatives to the procedure, including risks, benefits, and side effects related to the alternatives and risks related to not receiving this procedure. I have had all my questions answered and I acknowledge that no guarantee has been made as to the result that may be obtained.    4.  Should the need arise during my procedure, which includes change of level of care prior to discharge, I also consent to the administration of blood and/or blood products. Further, I understand that despite careful testing and screening of blood or blood products by collecting agencies, I may still be subject to ill effects as a result of receiving a blood transfusion and/or blood products. The following are some, but not all, of the potential risks that can occur: fever and allergic reactions, hemolytic reactions, transmission of diseases such as Hepatitis, AIDS and  Cytomegalovirus (CMV) and fluid overload. In the event that I wish to have an autologous transfusion of my own blood, or a directed donor transfusion, I will discuss this with my physician.  Check only if Refusing Blood or Blood Products  I understand refusal of blood or blood products as deemed necessary by my physician may have serious consequences to my condition to include possible death. I hereby assume responsibility for my refusal and release the hospital, its personnel, and my physicians from any responsibility for the consequences of my refusal.   [  ] Patient Refuses Blood      5.  I authorize the use of any specimen, organs, tissues, body parts or foreign objects that may be removed from my body during the procedure for diagnosis, research or teaching purposes and their subsequent disposal by hospital authorities. I also authorize the release of specimen test results and/or written reports to my treating physician on the hospital medical staff or other referring or consulting physicians involved in my care, at the discretion of the Pathologist or my treating physician.    6.  I consent to the photographing or videotaping of the procedures to be performed, including appropriate portions of my body for medical, scientific, or educational purposes, provided my identity is not revealed by the pictures or by descriptive texts accompanying them. If the procedure has been photographed/videotaped, the physician will obtain the original picture, image, videotape or CD. The hospital will not be responsible for storage, release or maintenance of the picture, image, tape or CD.   7.  I consent to the presence of a  or observers in the operating room as deemed necessary by my physician or their designees.    8.  I recognize that in the event my procedure results in extended X-Ray/fluoroscopy time, I may develop a skin reaction.    9.  If I have a Do Not Attempt Resuscitation (DNAR) order in place,  that status will be suspended while in the operating room, procedural suite, and during the recovery period unless otherwise explicitly stated by me (or a person authorized to consent on my behalf). The performing physician or my attending physician will determine when the applicable recovery period ends for purposes of reinstating the DNAR order.  10.  I acknowledge that my physician has explained sedation/analgesia administration to me including the risk and benefits I consent to the administration of sedation/analgesia as may be necessary or desirable in the judgment of my physician.      I CERTIFY THAT I HAVE READ AND FULLY UNDERSTAND THE ABOVE CONSENT FOR THE PROCEDURE.   Signature of Patient: _____________________________________________________________  Responsible person in case of minor, unconscious: ____________________________________  Relationship to patient:  __________________________________________________________  Signature of Witness: _______________________________Date: _________Time: __________    Statement of Physician: My signature below affirms that prior to the time of the procedure, I have explained to the patient and/or her guardian, the risks and benefits involved in the proposed treatment and any reasonable alternative to the proposed treatment. I have also explained the risks and benefits involved in the refusal of the proposed treatment and have answered the patient's questions. If I have a significant financial interest in a co-management agreement or a significant financial interest in any product or implant, or other significant relationship used in the procedure/surgery, I have disclosed this and had a discussion with my patient.  Signature of Physician:   _________________________________Date:_____________Time:________    Patient Name: Elin Walker : 1960  Printed: 2024   Medical Record #: [id number]